# Patient Record
Sex: MALE | Race: WHITE | NOT HISPANIC OR LATINO | URBAN - METROPOLITAN AREA
[De-identification: names, ages, dates, MRNs, and addresses within clinical notes are randomized per-mention and may not be internally consistent; named-entity substitution may affect disease eponyms.]

---

## 2017-04-12 ENCOUNTER — EMERGENCY (EMERGENCY)
Facility: HOSPITAL | Age: 27
LOS: 1 days | Discharge: HOME | End: 2017-04-12
Attending: EMERGENCY MEDICINE | Admitting: EMERGENCY MEDICINE
Payer: COMMERCIAL

## 2017-04-12 VITALS
RESPIRATION RATE: 16 BRPM | DIASTOLIC BLOOD PRESSURE: 76 MMHG | WEIGHT: 182.1 LBS | HEART RATE: 61 BPM | OXYGEN SATURATION: 99 % | SYSTOLIC BLOOD PRESSURE: 126 MMHG | TEMPERATURE: 98 F

## 2017-04-12 DIAGNOSIS — R51 HEADACHE: ICD-10-CM

## 2017-04-12 LAB
ALBUMIN SERPL ELPH-MCNC: 4.1 G/DL — SIGNIFICANT CHANGE UP (ref 3.3–5)
ALP SERPL-CCNC: 77 U/L — SIGNIFICANT CHANGE UP (ref 40–120)
ALT FLD-CCNC: 26 U/L — SIGNIFICANT CHANGE UP (ref 12–78)
ANION GAP SERPL CALC-SCNC: 6 MMOL/L — SIGNIFICANT CHANGE UP (ref 5–17)
APTT BLD: 33.9 SEC — SIGNIFICANT CHANGE UP (ref 27.5–37.4)
AST SERPL-CCNC: 27 U/L — SIGNIFICANT CHANGE UP (ref 15–37)
BASOPHILS # BLD AUTO: 0.1 K/UL — SIGNIFICANT CHANGE UP (ref 0–0.2)
BASOPHILS NFR BLD AUTO: 1 % — SIGNIFICANT CHANGE UP (ref 0–2)
BILIRUB SERPL-MCNC: 0.3 MG/DL — SIGNIFICANT CHANGE UP (ref 0.2–1.2)
BUN SERPL-MCNC: 20 MG/DL — SIGNIFICANT CHANGE UP (ref 7–23)
CALCIUM SERPL-MCNC: 8.8 MG/DL — SIGNIFICANT CHANGE UP (ref 8.5–10.1)
CHLORIDE SERPL-SCNC: 107 MMOL/L — SIGNIFICANT CHANGE UP (ref 96–108)
CO2 SERPL-SCNC: 29 MMOL/L — SIGNIFICANT CHANGE UP (ref 22–31)
CREAT SERPL-MCNC: 0.96 MG/DL — SIGNIFICANT CHANGE UP (ref 0.5–1.3)
EOSINOPHIL # BLD AUTO: 0.2 K/UL — SIGNIFICANT CHANGE UP (ref 0–0.5)
EOSINOPHIL NFR BLD AUTO: 3.7 % — SIGNIFICANT CHANGE UP (ref 0–6)
GLUCOSE SERPL-MCNC: 81 MG/DL — SIGNIFICANT CHANGE UP (ref 70–99)
HCT VFR BLD CALC: 43.7 % — SIGNIFICANT CHANGE UP (ref 39–50)
HGB BLD-MCNC: 15.1 G/DL — SIGNIFICANT CHANGE UP (ref 13–17)
INR BLD: 1 RATIO — SIGNIFICANT CHANGE UP (ref 0.88–1.16)
LYMPHOCYTES # BLD AUTO: 1.8 K/UL — SIGNIFICANT CHANGE UP (ref 1–3.3)
LYMPHOCYTES # BLD AUTO: 31.5 % — SIGNIFICANT CHANGE UP (ref 13–44)
MCHC RBC-ENTMCNC: 30.9 PG — SIGNIFICANT CHANGE UP (ref 27–34)
MCHC RBC-ENTMCNC: 34.5 GM/DL — SIGNIFICANT CHANGE UP (ref 32–36)
MCV RBC AUTO: 89.4 FL — SIGNIFICANT CHANGE UP (ref 80–100)
MONOCYTES # BLD AUTO: 0.5 K/UL — SIGNIFICANT CHANGE UP (ref 0–0.9)
MONOCYTES NFR BLD AUTO: 8.6 % — SIGNIFICANT CHANGE UP (ref 1–9)
NEUTROPHILS # BLD AUTO: 3.2 K/UL — SIGNIFICANT CHANGE UP (ref 1.8–7.4)
NEUTROPHILS NFR BLD AUTO: 55.2 % — SIGNIFICANT CHANGE UP (ref 43–77)
PLATELET # BLD AUTO: 238 K/UL — SIGNIFICANT CHANGE UP (ref 150–400)
POTASSIUM SERPL-MCNC: 3.9 MMOL/L — SIGNIFICANT CHANGE UP (ref 3.5–5.3)
POTASSIUM SERPL-SCNC: 3.9 MMOL/L — SIGNIFICANT CHANGE UP (ref 3.5–5.3)
PROT SERPL-MCNC: 7.4 G/DL — SIGNIFICANT CHANGE UP (ref 6–8.3)
PROTHROM AB SERPL-ACNC: 10.9 SEC — SIGNIFICANT CHANGE UP (ref 9.8–12.7)
RBC # BLD: 4.89 M/UL — SIGNIFICANT CHANGE UP (ref 4.2–5.8)
RBC # FLD: 12 % — SIGNIFICANT CHANGE UP (ref 10.3–14.5)
SODIUM SERPL-SCNC: 142 MMOL/L — SIGNIFICANT CHANGE UP (ref 135–145)
WBC # BLD: 5.7 K/UL — SIGNIFICANT CHANGE UP (ref 3.8–10.5)
WBC # FLD AUTO: 5.7 K/UL — SIGNIFICANT CHANGE UP (ref 3.8–10.5)

## 2017-04-12 PROCEDURE — 70450 CT HEAD/BRAIN W/O DYE: CPT | Mod: 26

## 2017-04-12 PROCEDURE — 85027 COMPLETE CBC AUTOMATED: CPT

## 2017-04-12 PROCEDURE — 93005 ELECTROCARDIOGRAM TRACING: CPT

## 2017-04-12 PROCEDURE — 80053 COMPREHEN METABOLIC PANEL: CPT

## 2017-04-12 PROCEDURE — 85730 THROMBOPLASTIN TIME PARTIAL: CPT

## 2017-04-12 PROCEDURE — 99284 EMERGENCY DEPT VISIT MOD MDM: CPT

## 2017-04-12 PROCEDURE — 85610 PROTHROMBIN TIME: CPT

## 2017-04-12 PROCEDURE — 99284 EMERGENCY DEPT VISIT MOD MDM: CPT | Mod: 25

## 2017-04-12 PROCEDURE — 70450 CT HEAD/BRAIN W/O DYE: CPT

## 2017-04-12 PROCEDURE — 70496 CT ANGIOGRAPHY HEAD: CPT

## 2017-04-12 NOTE — ED ADULT NURSE NOTE - OBJECTIVE STATEMENT
pt c/o headache wheil working out at gym yesterday and today with associated nausea but no vomiting. symptoms resolved but came for eval. denies all other c/o.

## 2017-04-12 NOTE — ED PROVIDER NOTE - ATTENDING CONTRIBUTION TO CARE
28 yo M p/w frontal headache past 2 days, noticed when working out at gym. no fall / trauma. No fever/chills. no numb/ting/focal weak. no neck pain / stiffness. No cp/sob/palp. NO other inj or co.  Exam with nl non-focal detailed neuro exam. Neck supple. nl rest of exam.   Check labs, CTA (head) , outpt neuro fu

## 2017-04-12 NOTE — ED ADULT NURSE NOTE - CHPI ED SYMPTOMS NEG
no blurred vision/no confusion/no dizziness/no fever/no loss of consciousness/no numbness/no vomiting/no weakness/no change in level of consciousness

## 2017-04-12 NOTE — ED PROVIDER NOTE - PROGRESS NOTE DETAILS
Pt doing well, no acute co. Pt has been asymptomatic since arrival in ed. Michael pt and family re need for close, prompt fu with Dr Andrés Cole and with Neuro. They will see Dr Sanchez in office asap. Michael Rodrigez, pt can see her in office tomorrow. Michael pt re need for fu in her office in am.  Michael pt re risk of occult path and to not do sig exertion / workouts until cleared by neurology.  All results were explained to patient and family and a copy of all available results given.

## 2017-04-12 NOTE — ED PROVIDER NOTE - OBJECTIVE STATEMENT
26 yo male presents with episode of pain behind right eye yesterday and today while working out, was doing push ups yesterday had pain behind right eye and today was lifting weights when same episode occurred.  today felt dizzy during episode. now resolved.    PMD Dr Corley

## 2020-02-20 ENCOUNTER — OUTPATIENT (OUTPATIENT)
Dept: OUTPATIENT SERVICES | Facility: HOSPITAL | Age: 30
LOS: 1 days | End: 2020-02-20
Payer: MEDICAID

## 2020-02-20 DIAGNOSIS — R10.11 RIGHT UPPER QUADRANT PAIN: ICD-10-CM

## 2020-02-20 PROCEDURE — 74220 X-RAY XM ESOPHAGUS 1CNTRST: CPT | Mod: 26

## 2020-02-20 PROCEDURE — 74220 X-RAY XM ESOPHAGUS 1CNTRST: CPT

## 2020-12-23 ENCOUNTER — TRANSCRIPTION ENCOUNTER (OUTPATIENT)
Age: 30
End: 2020-12-23

## 2021-08-30 PROBLEM — Z00.00 ENCOUNTER FOR PREVENTIVE HEALTH EXAMINATION: Noted: 2021-08-30

## 2021-09-08 ENCOUNTER — APPOINTMENT (OUTPATIENT)
Dept: ORTHOPEDIC SURGERY | Facility: CLINIC | Age: 31
End: 2021-09-08
Payer: MEDICAID

## 2021-09-08 PROCEDURE — 99203 OFFICE O/P NEW LOW 30 MIN: CPT

## 2021-09-08 PROCEDURE — 72080 X-RAY EXAM THORACOLMB 2/> VW: CPT

## 2021-09-12 ENCOUNTER — TRANSCRIPTION ENCOUNTER (OUTPATIENT)
Age: 31
End: 2021-09-12

## 2021-09-20 ENCOUNTER — APPOINTMENT (OUTPATIENT)
Dept: RHEUMATOLOGY | Facility: CLINIC | Age: 31
End: 2021-09-20

## 2022-03-03 ENCOUNTER — APPOINTMENT (OUTPATIENT)
Dept: ORTHOPEDIC SURGERY | Facility: CLINIC | Age: 32
End: 2022-03-03

## 2022-03-10 ENCOUNTER — APPOINTMENT (OUTPATIENT)
Dept: ORTHOPEDIC SURGERY | Facility: CLINIC | Age: 32
End: 2022-03-10
Payer: MEDICAID

## 2022-03-10 PROCEDURE — 99214 OFFICE O/P EST MOD 30 MIN: CPT

## 2022-03-10 NOTE — HISTORY OF PRESENT ILLNESS
[de-identified] : 32-year-old male here today for follow-up of his right-sided low back pain that radiates to his right flank.  Patient states that he has had pain since his last visit that has not resolved despite NSAID use as well as physical therapy.  He has been to a GI doctor who has done extensive work-up for his gallbladder as well as any intra-abdominal pathology and is found no reason why he is having this pain.  States that after he coughs he has severe pain in his back that radiates to the front of his right side.  States that he takes Advil as needed but it does not help much with the pain.  Denies any numbness tingling or neurovascular compromise in lower extremities.  Denies any bowel or bladder issues.

## 2022-03-10 NOTE — DISCUSSION/SUMMARY
[Medication Risks Reviewed] : Medication risks reviewed [de-identified] : Patient is a 32-year-old male with low back pain that is failed to resolve with conservative treatment including directed physical therapy as well as NSAID use.  He has had extensive GI work-up that has not shown any abnormalities.  I therefore recommended an MRI of his lumbar spine for further evaluation to see if there is any underlying cause of this low back pain.  I have also recommended diclofenac 50 mg twice daily as needed for the pain.  I will see him back after the MRI for repeat evaluation and management.  All questions were asked and answered.  He was advised return to the office if any new onset numbness weakness tingling bowel or bladder issues or gait disturbances.

## 2022-03-10 NOTE — PHYSICAL EXAM
[de-identified] : GENERAL APPEARANCE: Well nourished and hydrated, pleasant, alert, and oriented x 3. Appears their stated age. \par HEENT: Normocephalic, extraocular eye motion intact. Nasal septum midline. Oral cavity clear. External auditory canal clear. \par RESPIRATORY: Breath sounds clear and audible in all lobes. No wheezing, No accessory muscle use.\par CARDIOVASCULAR: No apparent abnormalities. No lower leg edema. No varicosities. Pedal pulses are palpable.\par NEUROLOGIC: Sensation is normal, no muscle weakness in the upper or lower extremities.\par DERMATOLOGIC: No apparent skin lesions, moist, warm, no rash.\par MUSCULOSKELETAL: Hands, wrists, and elbows are normal and move freely, shoulders are normal and move freely. \par Psychiatric: Oriented to person, place, and time, insight and judgement were intact and the affect was normal. \par SPINE: Tenderness palpation over the right paraspinal musculature, nontender palpation over the lumbar spinous processes, lumbar spine full range of motion with pains at extreme extension, negative straight leg raise bilaterally, hips full range of motion, knees full range of motion, quads hamstrings FHL EHL tib ant posterior tib 5 out of 5 strength, reflexes 2+, sensation intact to light touch bilateral\par

## 2022-03-22 ENCOUNTER — APPOINTMENT (OUTPATIENT)
Dept: ORTHOPEDIC SURGERY | Facility: CLINIC | Age: 32
End: 2022-03-22
Payer: MEDICAID

## 2022-03-22 PROCEDURE — 99441: CPT

## 2022-04-07 ENCOUNTER — TRANSCRIPTION ENCOUNTER (OUTPATIENT)
Age: 32
End: 2022-04-07

## 2022-05-06 ENCOUNTER — EMERGENCY (EMERGENCY)
Facility: HOSPITAL | Age: 32
LOS: 1 days | Discharge: HOME | End: 2022-05-06
Attending: EMERGENCY MEDICINE
Payer: COMMERCIAL

## 2022-05-06 VITALS
RESPIRATION RATE: 20 BRPM | DIASTOLIC BLOOD PRESSURE: 85 MMHG | HEART RATE: 130 BPM | TEMPERATURE: 98 F | HEIGHT: 68 IN | OXYGEN SATURATION: 98 % | WEIGHT: 220.02 LBS | SYSTOLIC BLOOD PRESSURE: 170 MMHG

## 2022-05-06 VITALS — HEART RATE: 90 BPM

## 2022-05-06 LAB
ALBUMIN SERPL ELPH-MCNC: 4.6 G/DL — SIGNIFICANT CHANGE UP (ref 3.3–5.2)
ALP SERPL-CCNC: 80 U/L — SIGNIFICANT CHANGE UP (ref 40–120)
ALT FLD-CCNC: 42 U/L — HIGH
ANION GAP SERPL CALC-SCNC: 14 MMOL/L — SIGNIFICANT CHANGE UP (ref 5–17)
AST SERPL-CCNC: 25 U/L — SIGNIFICANT CHANGE UP
BASOPHILS # BLD AUTO: 0.03 K/UL — SIGNIFICANT CHANGE UP (ref 0–0.2)
BASOPHILS NFR BLD AUTO: 0.3 % — SIGNIFICANT CHANGE UP (ref 0–2)
BILIRUB SERPL-MCNC: 0.3 MG/DL — LOW (ref 0.4–2)
BUN SERPL-MCNC: 12.6 MG/DL — SIGNIFICANT CHANGE UP (ref 8–20)
CALCIUM SERPL-MCNC: 9.1 MG/DL — SIGNIFICANT CHANGE UP (ref 8.6–10.2)
CHLORIDE SERPL-SCNC: 104 MMOL/L — SIGNIFICANT CHANGE UP (ref 98–107)
CO2 SERPL-SCNC: 23 MMOL/L — SIGNIFICANT CHANGE UP (ref 22–29)
CREAT SERPL-MCNC: 0.82 MG/DL — SIGNIFICANT CHANGE UP (ref 0.5–1.3)
EGFR: 121 ML/MIN/1.73M2 — SIGNIFICANT CHANGE UP
EOSINOPHIL # BLD AUTO: 0.04 K/UL — SIGNIFICANT CHANGE UP (ref 0–0.5)
EOSINOPHIL NFR BLD AUTO: 0.5 % — SIGNIFICANT CHANGE UP (ref 0–6)
GLUCOSE SERPL-MCNC: 92 MG/DL — SIGNIFICANT CHANGE UP (ref 70–99)
HCT VFR BLD CALC: 45.3 % — SIGNIFICANT CHANGE UP (ref 39–50)
HGB BLD-MCNC: 15.3 G/DL — SIGNIFICANT CHANGE UP (ref 13–17)
IMM GRANULOCYTES NFR BLD AUTO: 0.3 % — SIGNIFICANT CHANGE UP (ref 0–1.5)
LIDOCAIN IGE QN: 31 U/L — SIGNIFICANT CHANGE UP (ref 22–51)
LYMPHOCYTES # BLD AUTO: 1.53 K/UL — SIGNIFICANT CHANGE UP (ref 1–3.3)
LYMPHOCYTES # BLD AUTO: 17.4 % — SIGNIFICANT CHANGE UP (ref 13–44)
MCHC RBC-ENTMCNC: 29.3 PG — SIGNIFICANT CHANGE UP (ref 27–34)
MCHC RBC-ENTMCNC: 33.8 GM/DL — SIGNIFICANT CHANGE UP (ref 32–36)
MCV RBC AUTO: 86.6 FL — SIGNIFICANT CHANGE UP (ref 80–100)
MONOCYTES # BLD AUTO: 0.54 K/UL — SIGNIFICANT CHANGE UP (ref 0–0.9)
MONOCYTES NFR BLD AUTO: 6.2 % — SIGNIFICANT CHANGE UP (ref 2–14)
NEUTROPHILS # BLD AUTO: 6.6 K/UL — SIGNIFICANT CHANGE UP (ref 1.8–7.4)
NEUTROPHILS NFR BLD AUTO: 75.3 % — SIGNIFICANT CHANGE UP (ref 43–77)
PLATELET # BLD AUTO: 274 K/UL — SIGNIFICANT CHANGE UP (ref 150–400)
POTASSIUM SERPL-MCNC: 3.6 MMOL/L — SIGNIFICANT CHANGE UP (ref 3.5–5.3)
POTASSIUM SERPL-SCNC: 3.6 MMOL/L — SIGNIFICANT CHANGE UP (ref 3.5–5.3)
PROT SERPL-MCNC: 7.8 G/DL — SIGNIFICANT CHANGE UP (ref 6.6–8.7)
RBC # BLD: 5.23 M/UL — SIGNIFICANT CHANGE UP (ref 4.2–5.8)
RBC # FLD: 12.9 % — SIGNIFICANT CHANGE UP (ref 10.3–14.5)
SODIUM SERPL-SCNC: 141 MMOL/L — SIGNIFICANT CHANGE UP (ref 135–145)
WBC # BLD: 8.77 K/UL — SIGNIFICANT CHANGE UP (ref 3.8–10.5)
WBC # FLD AUTO: 8.77 K/UL — SIGNIFICANT CHANGE UP (ref 3.8–10.5)

## 2022-05-06 PROCEDURE — 74176 CT ABD & PELVIS W/O CONTRAST: CPT | Mod: 26,MA

## 2022-05-06 PROCEDURE — 99284 EMERGENCY DEPT VISIT MOD MDM: CPT | Mod: 25

## 2022-05-06 PROCEDURE — 85025 COMPLETE CBC W/AUTO DIFF WBC: CPT

## 2022-05-06 PROCEDURE — 80053 COMPREHEN METABOLIC PANEL: CPT

## 2022-05-06 PROCEDURE — 99285 EMERGENCY DEPT VISIT HI MDM: CPT

## 2022-05-06 PROCEDURE — 71250 CT THORAX DX C-: CPT | Mod: 26,MA

## 2022-05-06 PROCEDURE — 74176 CT ABD & PELVIS W/O CONTRAST: CPT | Mod: MA

## 2022-05-06 PROCEDURE — 96374 THER/PROPH/DIAG INJ IV PUSH: CPT

## 2022-05-06 PROCEDURE — 36415 COLL VENOUS BLD VENIPUNCTURE: CPT

## 2022-05-06 PROCEDURE — 71250 CT THORAX DX C-: CPT | Mod: MA

## 2022-05-06 PROCEDURE — 83690 ASSAY OF LIPASE: CPT

## 2022-05-06 RX ORDER — MELOXICAM 15 MG/1
1 TABLET ORAL
Qty: 30 | Refills: 0
Start: 2022-05-06 | End: 2022-06-04

## 2022-05-06 RX ORDER — KETOROLAC TROMETHAMINE 30 MG/ML
15 SYRINGE (ML) INJECTION ONCE
Refills: 0 | Status: DISCONTINUED | OUTPATIENT
Start: 2022-05-06 | End: 2022-05-06

## 2022-05-06 RX ORDER — METHOCARBAMOL 500 MG/1
2 TABLET, FILM COATED ORAL
Qty: 60 | Refills: 0
Start: 2022-05-06 | End: 2022-05-15

## 2022-05-06 RX ORDER — METHOCARBAMOL 500 MG/1
1500 TABLET, FILM COATED ORAL ONCE
Refills: 0 | Status: COMPLETED | OUTPATIENT
Start: 2022-05-06 | End: 2022-05-06

## 2022-05-06 RX ADMIN — Medication 15 MILLIGRAM(S): at 16:03

## 2022-05-06 RX ADMIN — METHOCARBAMOL 1500 MILLIGRAM(S): 500 TABLET, FILM COATED ORAL at 16:56

## 2022-05-06 NOTE — ED PROVIDER NOTE - RESPIRATORY, MLM
Breath sounds clear and equal bilaterally. ttp along approximately the 9th rib from the mid axillary line to the mid clavicular line

## 2022-05-06 NOTE — ED PROVIDER NOTE - OBJECTIVE STATEMENT
32 year old male with no PMH presents with R flank pain. Pt reports that he has been having intermittent R flank pain for approximately 5-6 months. Pt denies any obvious alleviating/exacerbating or inciting factors. Not related to exertion, deep inspiration, food intake. No associated fevers, chills, cough, vomiting, diarrhea, dysuria, hematuria. The pt has had extensive work up for this, including negative RUQ US x 2, HIDA scan, lumbar MRI, and abd MRI.

## 2022-05-06 NOTE — ED PROVIDER NOTE - PATIENT PORTAL LINK FT
You can access the FollowMyHealth Patient Portal offered by [facility] by registering at the following website: [email or web address]. By joining [employer]’s FollowMyHealth portal, you will also be able to view your health information using other applications (apps) compatible with our system.

## 2022-05-06 NOTE — ED PROVIDER NOTE - NSFOLLOWUPINSTRUCTIONS_ED_ALL_ED_FT
Please follow up with your doctor within 48 hours.     SEEK IMMEDIATE MEDICAL CARE IF YOU HAVE ANY OF THE FOLLOWING SYMPTOMS: worsening chest pain, coughing up blood, unexplained back/neck/jaw pain, severe abdominal pain, dizziness or lightheadedness, fainting, shortness of breath, sweaty or clammy skin, vomiting, or racing heart beat. These symptoms may represent a serious problem that is an emergency. Do not wait to see if the symptoms will go away. Get medical help right away. Call 911 and do not drive yourself to the hospital.     SEEK IMMEDIATE MEDICAL CARE IF YOU HAVE ANY OF THE FOLLOWING SYMPTOMS: worsening abdominal pain, uncontrollable vomiting, profuse diarrhea, inability to have bowel movements or pass gas, black or bloody stools, fever accompanying chest pain or back pain, or fainting. These symptoms may represent a serious problem that is an emergency. Do not wait to see if the symptoms will go away. Get medical help right away. Call 911 and do not drive yourself to the hospital.

## 2022-05-06 NOTE — ED PROVIDER NOTE - ATTENDING APP SHARED VISIT CONTRIBUTION OF CARE
I, Trav Sunshine, performed a face to face bedside interview with this patient regarding history of present illness, review of symptoms and relevant past medical, social and family history.  I completed an independent physical examination. I have communicated the patient’s plan of care and disposition with the ACP.  32 year old male with no PMH presents with R flank pain intermittently for the last 5-6 months. No cough, hemoptysis, LE edema, diarrhea, urinary complaints. Not related to exertion, deep inspiration or exertion. Pt with negative work up of abd mri, lumbar mri, US x 2, hida  Gen: NAD, well appearing  CV: RRR  Pul: CTA b/l, ttp along approximately the 9th rib from the mid axillary line to the mid clavicular line  Abd: Soft, non-distended, non-tender  Neuro: no focal deficits  Pt improved, reproducible along ribs, imaging neg, suspect muscular,  stable for dc

## 2022-05-06 NOTE — ED PROVIDER NOTE - NS ED ATTENDING STATEMENT MOD
This was a shared visit with the S[facility]. I reviewed and verified the documentation and independently performed the documented:

## 2022-05-06 NOTE — ED PROVIDER NOTE - PROGRESS NOTE DETAILS
Incidental findings discussed including lung nodule and need for follow up. All results printed and provided to PT to bring to his PMD. ED return precautions discussed.

## 2022-05-19 ENCOUNTER — APPOINTMENT (OUTPATIENT)
Dept: PHYSICAL MEDICINE AND REHAB | Facility: CLINIC | Age: 32
End: 2022-05-19

## 2022-05-24 ENCOUNTER — APPOINTMENT (OUTPATIENT)
Dept: UROLOGY | Facility: CLINIC | Age: 32
End: 2022-05-24
Payer: MEDICAID

## 2022-05-24 VITALS — WEIGHT: 220 LBS | HEIGHT: 68 IN | BODY MASS INDEX: 33.34 KG/M2

## 2022-05-24 PROCEDURE — 99204 OFFICE O/P NEW MOD 45 MIN: CPT

## 2022-05-24 NOTE — HISTORY OF PRESENT ILLNESS
[FreeTextEntry1] : 31 yo M for initial consultation\par no PMH\par no PSH\par NKDA\par not a smoker\par graduating radiology, getting  in the summer\par \par no previous history of nephrolithiasis\par had severe flank pain\par CT: no hydro, multiple punctate stones bilaterally\par also demonstrated a known hiatal hernia and a small 3 mm finding in the lung\par \par discussed the findings with the patient\par discussed stone prevention and metabolic evaluation\par discussed general recommendations for now\par - increasing fluid intake to produce 2 to 2.5 liters of urine per day (approx 3 liters intake), and should be primarily water.\par - Citrate is a benefit; yi and limes with most citrate and least sugar. Recommend a lemon or lime a day, easiest with concentrate, mixed into water or other beverages.\par Lifestyle changes: regular exercise and weight loss both are independent risk-reducers for stones.\par \par \par plan:\par - next visit in 2 months with new 24 hr urine\par - next visit will be with US\par

## 2022-05-24 NOTE — ASSESSMENT
[FreeTextEntry1] : \par plan:\par - next visit in 2 months with new 24 hr urine\par - next visit will be with US

## 2022-06-22 ENCOUNTER — APPOINTMENT (OUTPATIENT)
Dept: UROLOGY | Facility: CLINIC | Age: 32
End: 2022-06-22
Payer: MEDICAID

## 2022-06-22 PROCEDURE — 99214 OFFICE O/P EST MOD 30 MIN: CPT

## 2022-06-22 NOTE — ASSESSMENT
[FreeTextEntry1] : \par \par plan:\par - wait for ultrasound report and update results over the phone\par - continue with fluids and pain medication as needed\par - schedule TTM in 2 weeks to discuss symptoms\par - sent urinalysis and culture\par - if pain persists will need CT\par - if symptoms improve will assume he passed a stone and schedule next follow up in 6 months with new sono and 24 hr

## 2022-06-22 NOTE — HISTORY OF PRESENT ILLNESS
[FreeTextEntry1] : 33 yo M for follow up with nephrolithiasis\par see full notes from previous visit \par \par started with right flank pain radiating to the right upper quadrant\par last CT 05/2022 with small punctate stones bilaterally, largest no more than 2 mm\par had ultrasound today (brit) still no report, I do not see any hydronephrosis\par 24 hr urine: volumes 2.7-4.5, normal Ca and Ox, one with very low citrate, one with high UUN\par \par discussed findings of the 24 hr urine, does not need to drink as much, recommended 2-2.5 liters of urine daily. discussed the other findings and will not modify his diet drastically due to that for now, only recommended adding lemon and lime\par \par regarding the pain, exam now is unremarkable, discussed the possibility of him passing a stone, and will try MET\par will wait for results of ultrasound, and if the pain does not improve will nee a new CT to confirm. \par \par \par plan:\par - wait for ultrasound report and update results over the phone\par - continue with fluids and pain medication as needed\par - schedule TTM in 2 weeks to discuss symptoms\par - sent urinalysis and culture\par - if pain persists will need CT\par - if symptoms improve will assume he passed a stone and schedule next follow up in 6 months with new sono and 24 hr

## 2022-06-22 NOTE — PHYSICAL EXAM
[General Appearance - Well Developed] : well developed [General Appearance - Well Nourished] : well nourished [Normal Appearance] : normal appearance [Well Groomed] : well groomed [General Appearance - In No Acute Distress] : no acute distress [Abdomen Soft] : soft [Abdomen Tenderness] : non-tender [Abdomen Mass (___ Cm)] : no abdominal mass palpated [Costovertebral Angle Tenderness] : no ~M costovertebral angle tenderness [Edema] : no peripheral edema [] : no respiratory distress [Respiration, Rhythm And Depth] : normal respiratory rhythm and effort [Exaggerated Use Of Accessory Muscles For Inspiration] : no accessory muscle use [Oriented To Time, Place, And Person] : oriented to person, place, and time [Affect] : the affect was normal [Mood] : the mood was normal [Not Anxious] : not anxious [Normal Station and Gait] : the gait and station were normal for the patient's age

## 2022-06-23 LAB
APPEARANCE: CLEAR
BACTERIA: NEGATIVE
BILIRUBIN URINE: NEGATIVE
BLOOD URINE: NEGATIVE
COLOR: NORMAL
GLUCOSE QUALITATIVE U: NEGATIVE
HYALINE CASTS: 0 /LPF
KETONES URINE: NEGATIVE
LEUKOCYTE ESTERASE URINE: NEGATIVE
MICROSCOPIC-UA: NORMAL
NITRITE URINE: NEGATIVE
PH URINE: 7.5
PROTEIN URINE: NEGATIVE
RED BLOOD CELLS URINE: 0 /HPF
SPECIFIC GRAVITY URINE: 1.01
SQUAMOUS EPITHELIAL CELLS: 0 /HPF
UROBILINOGEN URINE: NORMAL
WHITE BLOOD CELLS URINE: 0 /HPF

## 2022-06-27 LAB — BACTERIA UR CULT: NORMAL

## 2022-07-06 ENCOUNTER — APPOINTMENT (OUTPATIENT)
Dept: UROLOGY | Facility: CLINIC | Age: 32
End: 2022-07-06

## 2022-07-08 ENCOUNTER — APPOINTMENT (OUTPATIENT)
Dept: UROLOGY | Facility: CLINIC | Age: 32
End: 2022-07-08

## 2022-07-08 PROCEDURE — 99442: CPT

## 2022-07-11 NOTE — HISTORY OF PRESENT ILLNESS
[Home] : at home, [unfilled] , at the time of the visit. [Medical Office: ([facility])___] : at the medical office located in  [FreeTextEntry1] : 31 yo M for follow up with nephrolithiasis\par see full notes from previous visit \par \par started with right flank pain radiating to the right upper quadrant\par last CT 05/2022 with small punctate stones bilaterally, largest no more than 2 mm\par had ultrasound with last visit (brit): no hydro, possible non-obstructing left renal stone\par 24 hr urine: volumes 2.7-4.5, normal Ca and Ox, one with very low citrate, one with high UUN\par \par patient is still feeling some discomfort and occasional pain\par no fever\par discussed this with the patient, will need a new CT to confirm no stones in the ureter\par \par plan:\par - CT\par - visit next week to discuss results

## 2022-07-12 ENCOUNTER — APPOINTMENT (OUTPATIENT)
Dept: CT IMAGING | Facility: CLINIC | Age: 32
End: 2022-07-12

## 2022-07-12 ENCOUNTER — OUTPATIENT (OUTPATIENT)
Dept: OUTPATIENT SERVICES | Facility: HOSPITAL | Age: 32
LOS: 1 days | End: 2022-07-12
Payer: MEDICAID

## 2022-07-12 DIAGNOSIS — N20.0 CALCULUS OF KIDNEY: ICD-10-CM

## 2022-07-12 PROCEDURE — 74176 CT ABD & PELVIS W/O CONTRAST: CPT | Mod: 26

## 2022-07-12 PROCEDURE — 74176 CT ABD & PELVIS W/O CONTRAST: CPT

## 2022-07-18 ENCOUNTER — APPOINTMENT (OUTPATIENT)
Dept: UROLOGY | Facility: CLINIC | Age: 32
End: 2022-07-18

## 2022-09-16 ENCOUNTER — APPOINTMENT (OUTPATIENT)
Dept: PHYSICAL MEDICINE AND REHAB | Facility: CLINIC | Age: 32
End: 2022-09-16

## 2022-09-16 VITALS
HEIGHT: 68 IN | SYSTOLIC BLOOD PRESSURE: 135 MMHG | HEART RATE: 89 BPM | WEIGHT: 220 LBS | DIASTOLIC BLOOD PRESSURE: 95 MMHG | BODY MASS INDEX: 33.34 KG/M2

## 2022-09-16 PROCEDURE — 99204 OFFICE O/P NEW MOD 45 MIN: CPT

## 2022-09-16 NOTE — PHYSICAL EXAM
[FreeTextEntry1] : NAD\par A&Ox3\par Mild obesity\par ROM L-spine: near full forward flexion; 25-30' extension w/o pain\par ROM Hips: smooth IRER w/o pain\par Pelvic tilt: none\par Seated slump test: neg right\par SLR: neg right\par ALESSIO's: neg right\par FADIR's: neg right\par DTR's: 2+ knees/ankles\par MMT: 5/5 b/l LE\par Sensation: SILT\par Toe & Heel Walk: Yes\par Palpation: R QL muscle TTP\par

## 2022-09-16 NOTE — DATA REVIEWED
[CT Scan] : CT Scan [MRI] : MRI [FreeTextEntry1] : CT ABD/Pelvis (May 2022):  Punctate bilateral nonobstructing renal calculi. No hydronephrosis or ureteral calculus.\par \par MRI L-spine: essentially normal.
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
## 2022-09-16 NOTE — ASSESSMENT
[FreeTextEntry1] : 32 y.o. M w/ h/o chronic right lateral lumbar/flank pain of unclear etiology.  I spent most of today's office visit (40 min) reviewing the patient's relevant imaging studies, discussing possible etiology, pathogenesis, further diagnostic work-up and non-operative management.  MRI L-spine w/o significant HNP, central canal or NF stenosis on patient's symptomatic right side.  There is note made of a non-acute appearing Schmorl's node at T10-11.  Pain is likely not visceral in nature.  Discussed utility of US guided right QL LA injections to better diagnosis his primary pain generator but would like patient to try more conservative measures first (ie, OMT, ART, etc.).  Will refer to Dr. Ram.  No role for LESI or LAKEISHAI.  Pt. and wife are in agreement with plan.  All questions answered.  RTC prn.

## 2022-09-29 ENCOUNTER — APPOINTMENT (OUTPATIENT)
Dept: PHYSICAL MEDICINE AND REHAB | Facility: CLINIC | Age: 32
End: 2022-09-29

## 2022-09-29 ENCOUNTER — TRANSCRIPTION ENCOUNTER (OUTPATIENT)
Age: 32
End: 2022-09-29

## 2022-09-29 ENCOUNTER — OUTPATIENT (OUTPATIENT)
Dept: OUTPATIENT SERVICES | Facility: HOSPITAL | Age: 32
LOS: 1 days | End: 2022-09-29

## 2022-09-29 DIAGNOSIS — M54.6 PAIN IN THORACIC SPINE: ICD-10-CM

## 2022-09-29 PROCEDURE — 99214 OFFICE O/P EST MOD 30 MIN: CPT

## 2022-09-29 PROCEDURE — 72070 X-RAY EXAM THORAC SPINE 2VWS: CPT | Mod: 26

## 2022-09-29 NOTE — DATA REVIEWED
[FreeTextEntry1] : CT renal stone hunt July 2022: Bones no aggressive lesion.\par \par MRI lumbar spine March 2022: Nonspecific straining.  No significant spinal canal or neuroforaminal stenosis.  Mild lower lumbar facet arthrosis.

## 2022-09-29 NOTE — HISTORY OF PRESENT ILLNESS
[FreeTextEntry1] : Mr. Young is a 32-year-old male with no significant past medical history presenting for evaluation.  He reports for over a year he has had significant pain in his right upper flank region.  Describes the pain as being tender to palpation with sometimes radiation.  Denies any overt weakness numbness tingling bowel bladder dysfunction.  He has had extensive work-up including in the ED, with urology with [facility] medicine.  In the past he has had CT and evaluation with GI as well as MRIs of the lumbar spine.  He reports that no overt cause can be identified.

## 2022-09-29 NOTE — PHYSICAL EXAM
[FreeTextEntry1] : Gen: Patient is A&O x 3, NAD\par HEENT: EOMI, hearing grossly normal\par Resp: regular, non - labored\par CV: pulses regular\par Skin: no rashes, erythema\par Lymph: no clubbing, cyanosis, edema, or palpable lymphadenopathy\par Inspection: no instability or misalignment\par ROM: full throughout\par Palpation: TTP right T7-T9 thoracic paraspinals \par Sensation: intact to light touch\par Reflexes: 1+ and symmetric throughout\par Strength: 5/5 throughout\par Special tests: -Osullivan's sign, -Straight leg raise \par Gait: normal, non-antalgic\par \par

## 2022-09-29 NOTE — ASSESSMENT
[FreeTextEntry1] : 32 year old male presenting for evaluation.\par \par #Flank pain/thoracic pain:\par -He reports he has had chronic pain in his right flank and thoracic region for over a year.  He reports he has had extensive work-up with no significant cause.\par -CT abdomen reviewed\par -MRI lumbar spine reviewed\par -Orthopedic notes reviewed, recommend continue conservative therapy\par -Discussed with patient he does appear on physical exam to have a component of some somatic dysfunction.  Discussed that this could be a cause of his pain if it is musculoskeletal in etiology.  Discussed with patient risks and benefits of OMT.\par -We will obtain x-ray of thoracic spine to further evaluate prior to manual therapy\par -Methocarbamol 750 mg as needed for severe muscle spasms\par -He is in agreement to a trial of OMT.\par \par Follow-up after x-ray for OMT.

## 2022-10-04 ENCOUNTER — APPOINTMENT (OUTPATIENT)
Dept: ORTHOPEDIC SURGERY | Facility: CLINIC | Age: 32
End: 2022-10-04

## 2022-10-06 ENCOUNTER — APPOINTMENT (OUTPATIENT)
Dept: PHYSICAL MEDICINE AND REHAB | Facility: CLINIC | Age: 32
End: 2022-10-06

## 2022-10-06 PROCEDURE — 98929 OSTEOPATH MANJ 9-10 REGIONS: CPT

## 2022-10-06 NOTE — DATA REVIEWED
[FreeTextEntry1] : EXAM: [id number] - XR T SPINE 2 VIEWS - ORDERED BY: BONY ESPINAL\par \par \par PROCEDURE DATE: 09/29/2022\par \par \par \par INTERPRETATION: CLINICAL INDICATION: back pain\par \par EXAM:\par AP lateral thoracic spine from 9/29/2022 at 1438. Compared appearance on chest CT from 5/6/2022.\par \par IMPRESSION:\par 12 rib pairs present.\par \par No compression fractures or spondylolistheses.\par \par T9-T11 Schmorl's node endplate changes apparent on CT images not well demonstrated radiographically. Otherwise preserved intervertebral disc spaces.\par \par Posterior facet alignment maintained.\par \par No discrete lytic or blastic lesions.\par \par Clear visualized lungs and midline normal caliber trachea.\par

## 2022-10-06 NOTE — PHYSICAL EXAM
[FreeTextEntry1] : Gen: Patient is A&O x 3, NAD\par HEENT: EOMI, hearing grossly normal\par Resp: regular, non - labored\par CV: pulses regular\par Skin: no rashes, erythema\par Lymph: no clubbing, cyanosis, edema, or palpable lymphadenopathy\par Inspection: no instability or misalignment\par ROM: full throughout\par Palpation: TTP right T7-T9 thoracic paraspinals, right periscapular muscles TTP  \par Sensation: intact to light touch\par Reflexes: 1+ and symmetric throughout\par Strength: 5/5 throughout\par Special tests: -Osullivan's sign, -Straight leg raise \par Gait: normal, non-antalgic\par \par Osteopathic Structural Exam:\par Cranial: OA rotated right\par Cervical: Right C4, C5 tenderpoint\par Thoracic: T9 ERRSBR\par Rib: Right posterior rib 7 tenderpoint \par Upper extremity: Right scapula myofascial restriction \par Lumbar: Right posterior L3 tenderpoint \par Pelvis: Right medial innominate \par Sacrum: Right sacral rotation\par Lower Extremity: RLE restricted in internal rotation \par \par \par \par \par

## 2022-10-06 NOTE — HISTORY OF PRESENT ILLNESS
[FreeTextEntry1] : Mr. Young is a 32-year-old male with no significant past medical history presenting for evaluation.  He reports for over a year he has had significant pain in his right upper flank region.  Describes the pain as being tender to palpation with sometimes radiation.  Denies any overt weakness numbness tingling bowel bladder dysfunction.  He has had extensive work-up including in the ED, with urology with [facility].  In the past he has had CT and evaluation with GI as well as MRIs of the lumbar spine.  He reports that no overt cause can be identified. \par \par Interval history:\par He reports he still is having pain and tightness at the entire right side of his body.  Worst pain is in his right flank with radiation across his abdomen.  Denies any new weakness numbness ting or bowel bladder dysfunction.  Reports he wants to trial OMT today.

## 2022-10-06 NOTE — ASSESSMENT
[FreeTextEntry1] : 32 year old male presenting for evaluation.\par \par #Flank pain/thoracic pain:\par -x-ray thoracic spine reviewed\par -Discussed risks and benefits of OMT\par -Osteopathic structural exam demonstrated somatic dysfunction and the patient agreed to osteopathic manipulation.\par  \par 1. Somatic dysfunction cranial\par -OMT performed with myofascial release\par 2. Somatic dysfunction cervical\par --OMT performed with counterstrain \par 3. Somatic dysfunction upper extremity\par --OMT performed with myofascial release\par 4. Somatic dysfunction rib\par --OMT performed with counterstrain\par 5. Somatic dysfunction thoracic \par --OMT performed with muscle energy \par 6. Somatic dysfunction lumbar \par --OMT performed with counterstrain\par 7. Somatic dysfunction pelvis\par --OMT performed with muscle energy \par 8. Somatic dysfunction sacrum\par --OMT performed with myofascial release \par 9. Somatic dysfunction lower extremity \par --OMT performed with myofascial release\par \par Patient tolerated treatment well.\par \par Follow up in 1-2 weeks.  \par

## 2022-10-13 ENCOUNTER — APPOINTMENT (OUTPATIENT)
Dept: PHYSICAL MEDICINE AND REHAB | Facility: CLINIC | Age: 32
End: 2022-10-13

## 2022-10-13 VITALS
DIASTOLIC BLOOD PRESSURE: 96 MMHG | HEIGHT: 68 IN | WEIGHT: 220 LBS | HEART RATE: 84 BPM | SYSTOLIC BLOOD PRESSURE: 142 MMHG | BODY MASS INDEX: 33.34 KG/M2

## 2022-10-13 PROCEDURE — 98929 OSTEOPATH MANJ 9-10 REGIONS: CPT

## 2022-10-13 NOTE — PHYSICAL EXAM
[FreeTextEntry1] : Gen: Patient is A&O x 3, NAD\par HEENT: EOMI, hearing grossly normal\par Resp: regular, non - labored\par CV: pulses regular\par Skin: no rashes, erythema\par Lymph: no clubbing, cyanosis, edema, or palpable lymphadenopathy\par Inspection: no instability or misalignment\par ROM: full throughout\par Palpation: TTP right T7-T9 thoracic paraspinals, right periscapular muscles TTP  \par Sensation: intact to light touch\par Reflexes: 1+ and symmetric throughout\par Strength: 5/5 throughout\par Special tests: -Osullivan's sign, -Straight leg raise \par Gait: normal, non-antalgic\par \par Osteopathic Structural Exam:\par Cranial: OA rotated right\par Cervical: Right C6, C7 tenderpoint\par Thoracic: T7 ERRSBR\par Rib: Right posterior rib 10 tenderpoint \par Upper extremity: Right scapula myofascial restriction \par Lumbar: Right posterior L4 tenderpoint \par Pelvis: Right medial innominate \par Sacrum: Right sacral rotation\par Lower Extremity: RLE restricted in internal rotation \par \par \par \par \par  48

## 2022-10-13 NOTE — HISTORY OF PRESENT ILLNESS
[FreeTextEntry1] : Mr. Young is a 32-year-old male with no significant past medical history presenting for evaluation.  He reports for over a year he has had significant pain in his right upper flank region.  Describes the pain as being tender to palpation with sometimes radiation.  Denies any overt weakness numbness tingling bowel bladder dysfunction.  He has had extensive work-up including in the ED, with urology with [facility].  In the past he has had CT and evaluation with GI as well as MRIs of the lumbar spine.  He reports that no overt cause can be identified. \par \par Interval history:\par He reports since last visit he did notice some improvement in his pain symptoms.  He reports that his back pain has improved.  He is now feeling some pain in his right flank and in the right anterior abdomen region.  Also reports some tightness in his right hip.  Reports his periscapular pain is improving.  Denies any new weakness numbness tingling or bowel bladder dysfunction.  Would like to proceed with OMT today.

## 2022-10-13 NOTE — ASSESSMENT
[FreeTextEntry1] : 32 year old male presenting for evaluation.\par \par #Flank pain/thoracic pain:\par -He is noting some improvement in his pain since last visit and would like to continue with OMT\par -Discussed risks and benefits of OMT\par -Osteopathic structural exam demonstrated somatic dysfunction and the patient agreed to osteopathic manipulation.\par  \par 1. Somatic dysfunction cranial\par -OMT performed with myofascial release\par 2. Somatic dysfunction cervical\par --OMT performed with counterstrain \par 3. Somatic dysfunction upper extremity\par --OMT performed with myofascial release\par 4. Somatic dysfunction rib\par --OMT performed with counterstrain\par 5. Somatic dysfunction thoracic \par --OMT performed with muscle energy \par 6. Somatic dysfunction lumbar \par --OMT performed with counterstrain\par 7. Somatic dysfunction pelvis\par --OMT performed with muscle energy \par 8. Somatic dysfunction sacrum\par --OMT performed with myofascial release \par 9. Somatic dysfunction lower extremity \par --OMT performed with myofascial release\par \par Patient tolerated treatment well.\par \par Follow up in 1-2 weeks.  \par

## 2022-10-18 ENCOUNTER — NON-APPOINTMENT (OUTPATIENT)
Age: 32
End: 2022-10-18

## 2022-10-18 ENCOUNTER — EMERGENCY (EMERGENCY)
Facility: HOSPITAL | Age: 32
LOS: 1 days | Discharge: HOME | End: 2022-10-18
Attending: EMERGENCY MEDICINE
Payer: COMMERCIAL

## 2022-10-18 VITALS
WEIGHT: 225.09 LBS | SYSTOLIC BLOOD PRESSURE: 154 MMHG | RESPIRATION RATE: 18 BRPM | HEIGHT: 68 IN | HEART RATE: 89 BPM | DIASTOLIC BLOOD PRESSURE: 89 MMHG | TEMPERATURE: 99 F | OXYGEN SATURATION: 97 %

## 2022-10-18 LAB
ALBUMIN SERPL ELPH-MCNC: 4.4 G/DL — SIGNIFICANT CHANGE UP (ref 3.3–5.2)
ALP SERPL-CCNC: 76 U/L — SIGNIFICANT CHANGE UP (ref 40–120)
ALT FLD-CCNC: 50 U/L — HIGH
ANION GAP SERPL CALC-SCNC: 15 MMOL/L — SIGNIFICANT CHANGE UP (ref 5–17)
AST SERPL-CCNC: 37 U/L — SIGNIFICANT CHANGE UP
BASOPHILS # BLD AUTO: 0.03 K/UL — SIGNIFICANT CHANGE UP (ref 0–0.2)
BASOPHILS NFR BLD AUTO: 0.4 % — SIGNIFICANT CHANGE UP (ref 0–2)
BILIRUB SERPL-MCNC: 0.3 MG/DL — LOW (ref 0.4–2)
BUN SERPL-MCNC: 17 MG/DL — SIGNIFICANT CHANGE UP (ref 8–20)
CALCIUM SERPL-MCNC: 9.3 MG/DL — SIGNIFICANT CHANGE UP (ref 8.4–10.5)
CHLORIDE SERPL-SCNC: 102 MMOL/L — SIGNIFICANT CHANGE UP (ref 98–107)
CO2 SERPL-SCNC: 22 MMOL/L — SIGNIFICANT CHANGE UP (ref 22–29)
CREAT SERPL-MCNC: 0.77 MG/DL — SIGNIFICANT CHANGE UP (ref 0.5–1.3)
D DIMER BLD IA.RAPID-MCNC: 686 NG/ML DDU — HIGH
EGFR: 124 ML/MIN/1.73M2 — SIGNIFICANT CHANGE UP
EOSINOPHIL # BLD AUTO: 0.06 K/UL — SIGNIFICANT CHANGE UP (ref 0–0.5)
EOSINOPHIL NFR BLD AUTO: 0.7 % — SIGNIFICANT CHANGE UP (ref 0–6)
GLUCOSE SERPL-MCNC: 91 MG/DL — SIGNIFICANT CHANGE UP (ref 70–99)
HCT VFR BLD CALC: 44.2 % — SIGNIFICANT CHANGE UP (ref 39–50)
HGB BLD-MCNC: 15.4 G/DL — SIGNIFICANT CHANGE UP (ref 13–17)
IMM GRANULOCYTES NFR BLD AUTO: 0.4 % — SIGNIFICANT CHANGE UP (ref 0–0.9)
LYMPHOCYTES # BLD AUTO: 1.15 K/UL — SIGNIFICANT CHANGE UP (ref 1–3.3)
LYMPHOCYTES # BLD AUTO: 14.1 % — SIGNIFICANT CHANGE UP (ref 13–44)
MCHC RBC-ENTMCNC: 29.4 PG — SIGNIFICANT CHANGE UP (ref 27–34)
MCHC RBC-ENTMCNC: 34.8 GM/DL — SIGNIFICANT CHANGE UP (ref 32–36)
MCV RBC AUTO: 84.5 FL — SIGNIFICANT CHANGE UP (ref 80–100)
MONOCYTES # BLD AUTO: 0.5 K/UL — SIGNIFICANT CHANGE UP (ref 0–0.9)
MONOCYTES NFR BLD AUTO: 6.1 % — SIGNIFICANT CHANGE UP (ref 2–14)
NEUTROPHILS # BLD AUTO: 6.39 K/UL — SIGNIFICANT CHANGE UP (ref 1.8–7.4)
NEUTROPHILS NFR BLD AUTO: 78.3 % — HIGH (ref 43–77)
PLATELET # BLD AUTO: 285 K/UL — SIGNIFICANT CHANGE UP (ref 150–400)
POTASSIUM SERPL-MCNC: 3.9 MMOL/L — SIGNIFICANT CHANGE UP (ref 3.5–5.3)
POTASSIUM SERPL-SCNC: 3.9 MMOL/L — SIGNIFICANT CHANGE UP (ref 3.5–5.3)
PROT SERPL-MCNC: 7.8 G/DL — SIGNIFICANT CHANGE UP (ref 6.6–8.7)
RBC # BLD: 5.23 M/UL — SIGNIFICANT CHANGE UP (ref 4.2–5.8)
RBC # FLD: 13 % — SIGNIFICANT CHANGE UP (ref 10.3–14.5)
SODIUM SERPL-SCNC: 139 MMOL/L — SIGNIFICANT CHANGE UP (ref 135–145)
TROPONIN T SERPL-MCNC: <0.01 NG/ML — SIGNIFICANT CHANGE UP (ref 0–0.06)
WBC # BLD: 8.16 K/UL — SIGNIFICANT CHANGE UP (ref 3.8–10.5)
WBC # FLD AUTO: 8.16 K/UL — SIGNIFICANT CHANGE UP (ref 3.8–10.5)

## 2022-10-18 PROCEDURE — 84484 ASSAY OF TROPONIN QUANT: CPT

## 2022-10-18 PROCEDURE — 80053 COMPREHEN METABOLIC PANEL: CPT

## 2022-10-18 PROCEDURE — 85379 FIBRIN DEGRADATION QUANT: CPT

## 2022-10-18 PROCEDURE — G1004: CPT

## 2022-10-18 PROCEDURE — 36415 COLL VENOUS BLD VENIPUNCTURE: CPT

## 2022-10-18 PROCEDURE — 96374 THER/PROPH/DIAG INJ IV PUSH: CPT | Mod: XU

## 2022-10-18 PROCEDURE — 71045 X-RAY EXAM CHEST 1 VIEW: CPT | Mod: 26

## 2022-10-18 PROCEDURE — 71275 CT ANGIOGRAPHY CHEST: CPT | Mod: ME

## 2022-10-18 PROCEDURE — 99284 EMERGENCY DEPT VISIT MOD MDM: CPT | Mod: 25

## 2022-10-18 PROCEDURE — 85025 COMPLETE CBC W/AUTO DIFF WBC: CPT

## 2022-10-18 PROCEDURE — 99285 EMERGENCY DEPT VISIT HI MDM: CPT

## 2022-10-18 PROCEDURE — 71045 X-RAY EXAM CHEST 1 VIEW: CPT

## 2022-10-18 PROCEDURE — 71275 CT ANGIOGRAPHY CHEST: CPT | Mod: 26,ME

## 2022-10-18 RX ORDER — KETOROLAC TROMETHAMINE 30 MG/ML
15 SYRINGE (ML) INJECTION ONCE
Refills: 0 | Status: DISCONTINUED | OUTPATIENT
Start: 2022-10-18 | End: 2022-10-18

## 2022-10-18 RX ORDER — METHOCARBAMOL 500 MG/1
1500 TABLET, FILM COATED ORAL ONCE
Refills: 0 | Status: COMPLETED | OUTPATIENT
Start: 2022-10-18 | End: 2022-10-18

## 2022-10-18 RX ADMIN — METHOCARBAMOL 1500 MILLIGRAM(S): 500 TABLET, FILM COATED ORAL at 15:14

## 2022-10-18 RX ADMIN — Medication 15 MILLIGRAM(S): at 15:15

## 2022-10-18 NOTE — ED PROVIDER NOTE - PATIENT PORTAL LINK FT
You can access the FollowMyHealth Patient Portal offered by [facility] by registering at the following website: [email or web address]. By joining [facility]’s FollowMyHealth portal, you will also be able to view your health information using other applications (apps) compatible with our system.

## 2022-10-18 NOTE — ED PROVIDER NOTE - OBJECTIVE STATEMENT
32 year old male with no pMH presents with R flank pain. the pt was seen here initially for this pain in May. CT showed intra-renal calculi, he followed up with Uro who advised that this was likely not the etiology of his pain, had negative study for gall stones, has been receiving OMT and has planned MR of T spine tomorrow. 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 His pain has been more persistent and severe over the past several day, worse with movement. It is associated with a burning and numbness along and just medial to his R scapula. He relayed this to one of the PAs that he works with, who performed an ekg on him in the office, told him it was abnormal and to go to the ED. He denies SOB, pain with deep inspiration, cough, LE edema, hemoptysis, dysuria, hematuria, nausea, vomiting, diarrhea.

## 2022-10-18 NOTE — ED ADULT TRIAGE NOTE - CHIEF COMPLAINT QUOTE
right sided cp "squeezing pain" onset last night; radiating to right scapula with + numbness . denies diaphoresis/sob/dizziness/n/v

## 2022-10-18 NOTE — ED PROVIDER NOTE - CLINICAL SUMMARY MEDICAL DECISION MAKING FREE TEXT BOX
I suspect the pain to be emanating from the thoracic spine. No PE, already worked up for renal and biliary colic, stable for dc supportive measures and MR tomorrow

## 2022-10-20 ENCOUNTER — NON-APPOINTMENT (OUTPATIENT)
Age: 32
End: 2022-10-20

## 2022-10-24 ENCOUNTER — APPOINTMENT (OUTPATIENT)
Dept: PHYSICAL MEDICINE AND REHAB | Facility: CLINIC | Age: 32
End: 2022-10-24

## 2022-10-27 ENCOUNTER — APPOINTMENT (OUTPATIENT)
Dept: NEUROSURGERY | Facility: CLINIC | Age: 32
End: 2022-10-27

## 2022-10-27 ENCOUNTER — APPOINTMENT (OUTPATIENT)
Dept: PHYSICAL MEDICINE AND REHAB | Facility: CLINIC | Age: 32
End: 2022-10-27

## 2022-10-27 VITALS
HEIGHT: 68 IN | SYSTOLIC BLOOD PRESSURE: 141 MMHG | WEIGHT: 220 LBS | DIASTOLIC BLOOD PRESSURE: 84 MMHG | BODY MASS INDEX: 33.34 KG/M2 | HEART RATE: 86 BPM

## 2022-10-27 VITALS
TEMPERATURE: 97.7 F | SYSTOLIC BLOOD PRESSURE: 147 MMHG | DIASTOLIC BLOOD PRESSURE: 85 MMHG | OXYGEN SATURATION: 99 % | HEART RATE: 73 BPM | HEIGHT: 68 IN | BODY MASS INDEX: 34.56 KG/M2 | WEIGHT: 228 LBS

## 2022-10-27 DIAGNOSIS — D17.79 BENIGN LIPOMATOUS NEOPLASM OF OTHER SITES: ICD-10-CM

## 2022-10-27 DIAGNOSIS — M99.04 SEGMENTAL AND SOMATIC DYSFUNCTION OF SACRAL REGION: ICD-10-CM

## 2022-10-27 DIAGNOSIS — M99.01 SEGMENTAL AND SOMATIC DYSFUNCTION OF CERVICAL REGION: ICD-10-CM

## 2022-10-27 PROCEDURE — 98929 OSTEOPATH MANJ 9-10 REGIONS: CPT

## 2022-10-27 PROCEDURE — 99204 OFFICE O/P NEW MOD 45 MIN: CPT

## 2022-10-27 RX ORDER — MELOXICAM 15 MG/1
15 TABLET ORAL
Qty: 30 | Refills: 0 | Status: COMPLETED | COMMUNITY
Start: 2021-09-08 | End: 2022-10-27

## 2022-10-27 RX ORDER — DICLOFENAC SODIUM 50 MG/1
50 TABLET, DELAYED RELEASE ORAL
Qty: 60 | Refills: 0 | Status: COMPLETED | COMMUNITY
Start: 2022-03-10 | End: 2022-10-27

## 2022-10-27 NOTE — PHYSICAL EXAM
[FreeTextEntry1] : Gen: Patient is A&O x 3, NAD\par HEENT: EOMI, hearing grossly normal\par Resp: regular, non - labored\par CV: pulses regular\par Skin: no rashes, erythema\par Lymph: no clubbing, cyanosis, edema, or palpable lymphadenopathy\par Inspection: no instability or misalignment\par ROM: full throughout\par Palpation: TTP right T9-T9 thoracic paraspinals, right periscapular muscles TTP, TTP right rib 6 anterior intercostal space  \par Sensation: intact to light touch\par Reflexes: 1+ and symmetric throughout\par Strength: 5/5 throughout\par Special tests: -Osullivan's sign, -Straight leg raise \par Gait: normal, non-antalgic\par \par Osteopathic Structural Exam:\par Cranial: OA rotated right\par Cervical: Right C3, C5 tenderpoint\par Thoracic: T9 ERRSBR\par Rib: Right anterior rib 6 tenderpoint \par Upper extremity: Right scapula myofascial restriction, Right levator scapula tenderpoint  \par Lumbar: Right posterior L4 tenderpoint \par Pelvis: Right medial innominate \par Sacrum: Right sacral rotation\par Lower Extremity: RLE restricted in internal rotation \par \par \par \par \par

## 2022-10-27 NOTE — HISTORY OF PRESENT ILLNESS
[FreeTextEntry1] : Mr. Young is a 32-year-old male with no significant past medical history presenting for evaluation.  He reports for over a year he has had significant pain in his right upper flank region.  Describes the pain as being tender to palpation with sometimes radiation.  Denies any overt weakness numbness tingling bowel bladder dysfunction.  He has had extensive work-up including in the ED, with urology with [facility] sports medicine.  In the past he has had CT and evaluation with GI as well as MRIs of the lumbar spine.  He reports that no overt cause can be identified. \par \par Interval history:\par He reports that he had significant improvement with OMT at his last visit and had almost complete relief of pain.  However he went golfing and states that this severely exacerbated his pain.  He was given steroids and said that this helped decrease the pain.  Still reports the worst pain is radiation across his right flank and rib region.  Denies any weakness bowel bladder dysfunction or numbness or tingling.
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
## 2022-10-27 NOTE — ASSESSMENT
[FreeTextEntry1] : 32 year old male presenting for evaluation.\par \par #Flank pain/thoracic pain:\par -MRI results reviewed with patient.\par -Given persistence/severity of symptoms will obtain opinion from Neurosurgery, will also consider interventional spine consultation \par -He continues to report that OMT is providing significant relief to his symptoms and he would like to continue with OMT\par -Osteopathic structural exam demonstrated somatic dysfunction and the patient agreed to osteopathic manipulation.\par  \par 1. Somatic dysfunction cranial\par -OMT performed with myofascial release\par 2. Somatic dysfunction cervical\par --OMT performed with counterstrain \par 3. Somatic dysfunction upper extremity\par --OMT performed with myofascial release\par 4. Somatic dysfunction rib\par --OMT performed with counterstrain\par 5. Somatic dysfunction thoracic \par --OMT performed with muscle energy \par 6. Somatic dysfunction lumbar \par --OMT performed with counterstrain\par 7. Somatic dysfunction pelvis\par --OMT performed with myofascial release  \par 8. Somatic dysfunction sacrum\par --OMT performed with myofascial release \par 9. Somatic dysfunction lower extremity \par --OMT performed with myofascial release\par \par Patient tolerated treatment well.\par \par Follow up in 1-2 weeks.  \par

## 2022-11-02 ENCOUNTER — APPOINTMENT (OUTPATIENT)
Dept: PHYSICAL MEDICINE AND REHAB | Facility: CLINIC | Age: 32
End: 2022-11-02

## 2022-11-02 DIAGNOSIS — M99.05 SEGMENTAL AND SOMATIC DYSFUNCTION OF PELVIC REGION: ICD-10-CM

## 2022-11-02 DIAGNOSIS — M99.06 SEGMENTAL AND SOMATIC DYSFUNCTION OF LOWER EXTREMITY: ICD-10-CM

## 2022-11-02 DIAGNOSIS — M99.03 SEGMENTAL AND SOMATIC DYSFUNCTION OF LUMBAR REGION: ICD-10-CM

## 2022-11-02 DIAGNOSIS — M99.08 SEGMENTAL AND SOMATIC DYSFUNCTION OF RIB CAGE: ICD-10-CM

## 2022-11-02 DIAGNOSIS — M99.02 SEGMENTAL AND SOMATIC DYSFUNCTION OF THORACIC REGION: ICD-10-CM

## 2022-11-02 DIAGNOSIS — M99.00 SEGMENTAL AND SOMATIC DYSFUNCTION OF HEAD REGION: ICD-10-CM

## 2022-11-02 DIAGNOSIS — M99.07 SEGMENTAL AND SOMATIC DYSFUNCTION OF UPPER EXTREMITY: ICD-10-CM

## 2022-11-02 PROCEDURE — 98928 OSTEOPATH MANJ 7-8 REGIONS: CPT

## 2022-11-02 PROCEDURE — 99214 OFFICE O/P EST MOD 30 MIN: CPT | Mod: 25

## 2022-11-02 RX ORDER — CARISOPRODOL 250 MG/1
250 TABLET ORAL 4 TIMES DAILY
Qty: 60 | Refills: 0 | Status: DISCONTINUED | COMMUNITY
Start: 2022-10-27 | End: 2022-11-02

## 2022-11-02 RX ORDER — METHOCARBAMOL 750 MG/1
750 TABLET, FILM COATED ORAL
Qty: 60 | Refills: 0 | Status: DISCONTINUED | COMMUNITY
Start: 2022-05-06

## 2022-11-02 RX ORDER — CEPHALEXIN 500 MG/1
500 CAPSULE ORAL
Qty: 20 | Refills: 0 | Status: DISCONTINUED | COMMUNITY
Start: 2022-07-24

## 2022-11-02 RX ORDER — METHYLPREDNISOLONE 4 MG/1
4 TABLET ORAL
Qty: 21 | Refills: 0 | Status: DISCONTINUED | COMMUNITY
Start: 2022-10-21

## 2022-11-02 NOTE — DATA REVIEWED
[FreeTextEntry1] : MRI thoracic spine October 2022: Mild thoracic levoscoliosis.  Epidermal lipomatosis.

## 2022-11-02 NOTE — PHYSICAL EXAM
[FreeTextEntry1] : Gen: Patient is A&O x 3, NAD\par HEENT: EOMI, hearing grossly normal\par Resp: regular, non - labored\par CV: pulses regular\par Skin: no rashes, erythema\par Lymph: no clubbing, cyanosis, edema, or palpable lymphadenopathy\par Inspection: no instability or misalignment\par ROM: full throughout\par Palpation: TTP right T7-T9 thoracic paraspinals, right periscapular muscles TTP, TTP right rib 7 anterior intercostal space  \par Sensation: intact to light touch\par Reflexes: 1+ and symmetric throughout\par Strength: 5/5 throughout\par Special tests: -Osullivan's sign, -Straight leg raise \par Gait: normal, non-antalgic\par \par Osteopathic Structural Exam:\par Cranial: OA rotated right\par Thoracic: T9 ERRSBR\par Rib: Right anterior rib 7 tenderpoint \par Upper extremity: Right scapula myofascial restriction, Right levator scapula tenderpoint  \par Lumbar: Right posterior L3 tenderpoint \par Pelvis: Right medial innominate \par Lower Extremity: RLE restricted in internal rotation \par \par \par \par \par

## 2022-11-02 NOTE — HISTORY OF PRESENT ILLNESS
[FreeTextEntry1] : Mr. Young is a 32-year-old male with no significant past medical history presenting for evaluation.  He reports for over a year he has had significant pain in his right upper flank region.  Describes the pain as being tender to palpation with sometimes radiation.  Denies any overt weakness numbness tingling bowel bladder dysfunction.  He has had extensive work-up including in the ED, with urology with [facility].  In the past he has had CT and evaluation with GI as well as MRIs of the lumbar spine.  He reports that no overt cause can be identified. \par \par Interval history:\par He reports he stills feeling severe and persistent pain in his right chest wall and periscapular/thoracic region.  Describes like a vice  in the area.  He has previously had multiple evaluations in the ER with multiple scans.  Recently saw neurosurgery for evaluation.  Reports OMT does help but sometimes the symptoms return.  Denies any overt new weakness numbness ting or bowel bladder dysfunction.

## 2022-11-02 NOTE — ASSESSMENT
[FreeTextEntry1] : 32 year old male presenting for evaluation.\par \par #Neuropathic pain:\par -Start duloxetine 30mg daily\par \par #Low back pain:\par -Evaluated by Neurosurgery, recommended to start PT\par -Start physical therapy for core strengthening, modalities and stretching \par \par #Myofascial pain:\par -No over neurologic deficits on exam \par -Osteopathic structural exam demonstrated somatic dysfunction and the patient agreed to osteopathic manipulation.\par  \par 1. Somatic dysfunction cranial\par -OMT performed with myofascial release\par 2. Somatic dysfunction upper extremity\par --OMT performed with myofascial release\par 3. Somatic dysfunction rib\par --OMT performed with counterstrain\par 4. Somatic dysfunction thoracic \par --OMT performed with muscle energy \par 5. Somatic dysfunction lumbar \par --OMT performed with counterstrain\par 6. Somatic dysfunction pelvis\par --OMT performed with myofascial release  \par 7. Somatic dysfunction lower extremity \par --OMT performed with myofascial release\par \par Patient tolerated treatment well.\par \par Follow up in 1-2 weeks.  \par

## 2022-11-25 ENCOUNTER — APPOINTMENT (OUTPATIENT)
Dept: PHYSICAL MEDICINE AND REHAB | Facility: CLINIC | Age: 32
End: 2022-11-25

## 2023-01-09 ENCOUNTER — APPOINTMENT (OUTPATIENT)
Dept: FAMILY MEDICINE | Facility: CLINIC | Age: 33
End: 2023-01-09
Payer: COMMERCIAL

## 2023-01-09 VITALS
OXYGEN SATURATION: 98 % | BODY MASS INDEX: 36.98 KG/M2 | HEIGHT: 68 IN | SYSTOLIC BLOOD PRESSURE: 130 MMHG | HEART RATE: 78 BPM | DIASTOLIC BLOOD PRESSURE: 80 MMHG | TEMPERATURE: 98 F | WEIGHT: 244 LBS

## 2023-01-09 DIAGNOSIS — Z83.3 FAMILY HISTORY OF DIABETES MELLITUS: ICD-10-CM

## 2023-01-09 DIAGNOSIS — Z76.89 PERSONS ENCOUNTERING HEALTH SERVICES IN OTHER SPECIFIED CIRCUMSTANCES: ICD-10-CM

## 2023-01-09 DIAGNOSIS — Z82.49 FAMILY HISTORY OF ISCHEMIC HEART DISEASE AND OTHER DISEASES OF THE CIRCULATORY SYSTEM: ICD-10-CM

## 2023-01-09 DIAGNOSIS — Z78.9 OTHER SPECIFIED HEALTH STATUS: ICD-10-CM

## 2023-01-09 PROCEDURE — G0444 DEPRESSION SCREEN ANNUAL: CPT | Mod: 59

## 2023-01-09 PROCEDURE — 99385 PREV VISIT NEW AGE 18-39: CPT | Mod: 25

## 2023-01-09 RX ORDER — CELECOXIB 200 MG/1
200 CAPSULE ORAL TWICE DAILY
Qty: 60 | Refills: 0 | Status: DISCONTINUED | COMMUNITY
Start: 2022-10-27 | End: 2023-01-09

## 2023-01-09 RX ORDER — DULOXETINE HYDROCHLORIDE 30 MG/1
30 CAPSULE, DELAYED RELEASE PELLETS ORAL
Qty: 30 | Refills: 2 | Status: DISCONTINUED | COMMUNITY
Start: 2022-11-02 | End: 2023-01-09

## 2023-01-10 ENCOUNTER — APPOINTMENT (OUTPATIENT)
Age: 33
End: 2023-01-10

## 2023-01-10 LAB
25(OH)D3 SERPL-MCNC: 30.1 NG/ML
ALBUMIN SERPL ELPH-MCNC: 4.5 G/DL
ALP BLD-CCNC: 74 U/L
ALT SERPL-CCNC: 73 U/L
ANION GAP SERPL CALC-SCNC: 12 MMOL/L
APPEARANCE: CLEAR
AST SERPL-CCNC: 36 U/L
BACTERIA: NEGATIVE
BASOPHILS # BLD AUTO: 0.02 K/UL
BASOPHILS NFR BLD AUTO: 0.3 %
BILIRUB SERPL-MCNC: 0.2 MG/DL
BILIRUBIN URINE: NEGATIVE
BLOOD URINE: NEGATIVE
BUN SERPL-MCNC: 15 MG/DL
CALCIUM SERPL-MCNC: 9.2 MG/DL
CHLORIDE SERPL-SCNC: 105 MMOL/L
CHOLEST SERPL-MCNC: 163 MG/DL
CO2 SERPL-SCNC: 24 MMOL/L
COLOR: NORMAL
CREAT SERPL-MCNC: 0.85 MG/DL
EGFR: 119 ML/MIN/1.73M2
EOSINOPHIL # BLD AUTO: 0.13 K/UL
EOSINOPHIL NFR BLD AUTO: 2.2 %
ESTIMATED AVERAGE GLUCOSE: 111 MG/DL
GLUCOSE QUALITATIVE U: NEGATIVE
GLUCOSE SERPL-MCNC: 109 MG/DL
HBA1C MFR BLD HPLC: 5.5 %
HCT VFR BLD CALC: 48.4 %
HDLC SERPL-MCNC: 50 MG/DL
HGB BLD-MCNC: 15.9 G/DL
HYALINE CASTS: 0 /LPF
IMM GRANULOCYTES NFR BLD AUTO: 0.3 %
KETONES URINE: NEGATIVE
LDLC SERPL CALC-MCNC: 101 MG/DL
LEUKOCYTE ESTERASE URINE: NEGATIVE
LYMPHOCYTES # BLD AUTO: 1.49 K/UL
LYMPHOCYTES NFR BLD AUTO: 25.1 %
MAN DIFF?: NORMAL
MCHC RBC-ENTMCNC: 29.1 PG
MCHC RBC-ENTMCNC: 32.9 GM/DL
MCV RBC AUTO: 88.5 FL
MICROSCOPIC-UA: NORMAL
MONOCYTES # BLD AUTO: 0.5 K/UL
MONOCYTES NFR BLD AUTO: 8.4 %
NEUTROPHILS # BLD AUTO: 3.77 K/UL
NEUTROPHILS NFR BLD AUTO: 63.7 %
NITRITE URINE: NEGATIVE
NONHDLC SERPL-MCNC: 114 MG/DL
PH URINE: 7
PLATELET # BLD AUTO: 268 K/UL
POTASSIUM SERPL-SCNC: 4.7 MMOL/L
PROT SERPL-MCNC: 7.3 G/DL
PROTEIN URINE: NEGATIVE
RBC # BLD: 5.47 M/UL
RBC # FLD: 13.9 %
RED BLOOD CELLS URINE: 0 /HPF
SODIUM SERPL-SCNC: 141 MMOL/L
SPECIFIC GRAVITY URINE: 1.01
SQUAMOUS EPITHELIAL CELLS: 0 /HPF
TRIGL SERPL-MCNC: 60 MG/DL
TSH SERPL-ACNC: 1.18 UIU/ML
URATE SERPL-MCNC: 6.5 MG/DL
UROBILINOGEN URINE: NORMAL
WBC # FLD AUTO: 5.93 K/UL
WHITE BLOOD CELLS URINE: 0 /HPF

## 2023-01-10 NOTE — PHYSICAL EXAM
[Normal Sclera/Conjunctiva] : normal sclera/conjunctiva [PERRL] : pupils equal round and reactive to light [EOMI] : extraocular movements intact [No Carotid Bruits] : no carotid bruits [Pedal Pulses Present] : the pedal pulses are present [No Edema] : there was no peripheral edema [Normal] : soft, non-tender, non-distended, no masses palpated, no HSM and normal bowel sounds [Grossly Normal Strength/Tone] : grossly normal strength/tone [No Rash] : no rash [Normal Gait] : normal gait [Normal Affect] : the affect was normal [Normal Insight/Judgement] : insight and judgment were intact

## 2023-01-11 ENCOUNTER — NON-APPOINTMENT (OUTPATIENT)
Age: 33
End: 2023-01-11

## 2023-01-11 NOTE — HEALTH RISK ASSESSMENT
[Good] : ~his/her~  mood as  good [Never] : Never [Yes] : Yes [2 - 3 times a week (3 pts)] : 2 - 3  times a week (3 points) [3 or 4 (1 pt)] : 3 or 4  (1 point) [Never (0 pts)] : Never (0 points) [0] : 2) Feeling down, depressed, or hopeless: Not at all (0) [PHQ-2 Negative - No further assessment needed] : PHQ-2 Negative - No further assessment needed [I have developed a follow-up plan documented below in the note.] : I have developed a follow-up plan documented below in the note. [None] : None [With Significant Other] : lives with significant other [Employed] : employed [] :  [# Of Children ___] : has [unfilled] children [Feels Safe at Home] : Feels safe at home [Fully functional (bathing, dressing, toileting, transferring, walking, feeding)] : Fully functional (bathing, dressing, toileting, transferring, walking, feeding) [Fully functional (using the telephone, shopping, preparing meals, housekeeping, doing laundry, using] : Fully functional and needs no help or supervision to perform IADLs (using the telephone, shopping, preparing meals, housekeeping, doing laundry, using transportation, managing medications and managing finances) [Audit-CScore] : 4 [WZO3Fpozo] : 0 [

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
## 2023-01-11 NOTE — PLAN
[FreeTextEntry1] : Start Duloxetine 20mg daily\par Continue Xanax PRN \par Start Ozempic - if covered by insurance \par BW drawn today\par Encourage well balanced diet, 150 minutes of physical activity a week \par f/u 1 month

## 2023-01-16 ENCOUNTER — TRANSCRIPTION ENCOUNTER (OUTPATIENT)
Age: 33
End: 2023-01-16

## 2023-01-17 ENCOUNTER — APPOINTMENT (OUTPATIENT)
Dept: NEUROSURGERY | Facility: CLINIC | Age: 33
End: 2023-01-17

## 2023-01-18 ENCOUNTER — APPOINTMENT (OUTPATIENT)
Dept: UROLOGY | Facility: CLINIC | Age: 33
End: 2023-01-18
Payer: COMMERCIAL

## 2023-01-18 PROCEDURE — 99214 OFFICE O/P EST MOD 30 MIN: CPT

## 2023-01-18 NOTE — ASSESSMENT
[FreeTextEntry1] : \par plan:\par - right ureteroscopy\par - will need future metabolic evaluation\par - might need to consult with pain clinic in the future\par \par

## 2023-01-18 NOTE — HISTORY OF PRESENT ILLNESS
[FreeTextEntry1] : 34 yo M for follow up\par healthy\par NKDA\par \par known history of stone in the right kidney\par previously had left ureteroscopy\par \par new ultrasound: no hydro, 5 mm in right kidney, no stones in left\par has intermittent right flank pain, lasts several hours, no fever, no hematuria\par last CT 07/2022: only scattered punctate stones in the right kidney\par \par discussed the findings with the patient\par not sure it is a single 5 mm stone as described in the ultrasound or small stones next to each other.\par not sure if the pain described is due to the stones, and may be something else.\par  there is a chance the stones are shifting sometimes and obstructing a single infundibulum intermittently\par \par after discussing option, the patient is interested in ureteroscopy to treat the stones and make sure there are no obstructions. understands this may not relieve the pain.\par discussed ureteroscopy\par Risks, benefits and alternatives discussed. Procedure, in hospital stay and recovery explained. Risk of infection, multiple procedures, ureteral injury, ureteral stricture, incomplete stone clearance, sepsis, bleeding, and retention, all discussed.\par  \par \par plan:\par - right ureteroscopy\par - will need future metabolic evaluation\par - might need to consult with pain clinic in the future\par

## 2023-01-24 ENCOUNTER — NON-APPOINTMENT (OUTPATIENT)
Age: 33
End: 2023-01-24

## 2023-01-25 ENCOUNTER — OUTPATIENT (OUTPATIENT)
Dept: OUTPATIENT SERVICES | Facility: HOSPITAL | Age: 33
LOS: 1 days | End: 2023-01-25
Payer: COMMERCIAL

## 2023-01-25 ENCOUNTER — TRANSCRIPTION ENCOUNTER (OUTPATIENT)
Age: 33
End: 2023-01-25

## 2023-01-25 VITALS
DIASTOLIC BLOOD PRESSURE: 84 MMHG | HEIGHT: 68 IN | SYSTOLIC BLOOD PRESSURE: 118 MMHG | HEART RATE: 64 BPM | RESPIRATION RATE: 18 BRPM | OXYGEN SATURATION: 97 % | TEMPERATURE: 97 F | WEIGHT: 237.44 LBS

## 2023-01-25 DIAGNOSIS — Z91.89 OTHER SPECIFIED PERSONAL RISK FACTORS, NOT ELSEWHERE CLASSIFIED: ICD-10-CM

## 2023-01-25 DIAGNOSIS — N20.0 CALCULUS OF KIDNEY: ICD-10-CM

## 2023-01-25 DIAGNOSIS — Z01.818 ENCOUNTER FOR OTHER PREPROCEDURAL EXAMINATION: ICD-10-CM

## 2023-01-25 LAB
A1C WITH ESTIMATED AVERAGE GLUCOSE RESULT: 5.5 % — SIGNIFICANT CHANGE UP (ref 4–5.6)
ALBUMIN SERPL ELPH-MCNC: 4.4 G/DL — SIGNIFICANT CHANGE UP (ref 3.3–5.2)
ALP SERPL-CCNC: 73 U/L — SIGNIFICANT CHANGE UP (ref 40–120)
ALT FLD-CCNC: 54 U/L — HIGH
ANION GAP SERPL CALC-SCNC: 8 MMOL/L — SIGNIFICANT CHANGE UP (ref 5–17)
APPEARANCE UR: CLEAR — SIGNIFICANT CHANGE UP
APTT BLD: 30.8 SEC — SIGNIFICANT CHANGE UP (ref 27.5–35.5)
AST SERPL-CCNC: 31 U/L — SIGNIFICANT CHANGE UP
BASOPHILS # BLD AUTO: 0.02 K/UL — SIGNIFICANT CHANGE UP (ref 0–0.2)
BASOPHILS NFR BLD AUTO: 0.3 % — SIGNIFICANT CHANGE UP (ref 0–2)
BILIRUB SERPL-MCNC: 0.3 MG/DL — LOW (ref 0.4–2)
BILIRUB UR-MCNC: NEGATIVE — SIGNIFICANT CHANGE UP
BUN SERPL-MCNC: 15.3 MG/DL — SIGNIFICANT CHANGE UP (ref 8–20)
CALCIUM SERPL-MCNC: 9.2 MG/DL — SIGNIFICANT CHANGE UP (ref 8.4–10.5)
CHLORIDE SERPL-SCNC: 105 MMOL/L — SIGNIFICANT CHANGE UP (ref 96–108)
CO2 SERPL-SCNC: 26 MMOL/L — SIGNIFICANT CHANGE UP (ref 22–29)
COLOR SPEC: YELLOW — SIGNIFICANT CHANGE UP
CREAT SERPL-MCNC: 0.9 MG/DL — SIGNIFICANT CHANGE UP (ref 0.5–1.3)
DIFF PNL FLD: NEGATIVE — SIGNIFICANT CHANGE UP
EGFR: 117 ML/MIN/1.73M2 — SIGNIFICANT CHANGE UP
EOSINOPHIL # BLD AUTO: 0.16 K/UL — SIGNIFICANT CHANGE UP (ref 0–0.5)
EOSINOPHIL NFR BLD AUTO: 2.6 % — SIGNIFICANT CHANGE UP (ref 0–6)
ESTIMATED AVERAGE GLUCOSE: 111 MG/DL — SIGNIFICANT CHANGE UP (ref 68–114)
GLUCOSE SERPL-MCNC: 116 MG/DL — HIGH (ref 70–99)
GLUCOSE UR QL: NEGATIVE MG/DL — SIGNIFICANT CHANGE UP
HCT VFR BLD CALC: 45.8 % — SIGNIFICANT CHANGE UP (ref 39–50)
HGB BLD-MCNC: 15.2 G/DL — SIGNIFICANT CHANGE UP (ref 13–17)
IMM GRANULOCYTES NFR BLD AUTO: 0.3 % — SIGNIFICANT CHANGE UP (ref 0–0.9)
INR BLD: 1.02 RATIO — SIGNIFICANT CHANGE UP (ref 0.88–1.16)
KETONES UR-MCNC: NEGATIVE — SIGNIFICANT CHANGE UP
LEUKOCYTE ESTERASE UR-ACNC: NEGATIVE — SIGNIFICANT CHANGE UP
LYMPHOCYTES # BLD AUTO: 1.13 K/UL — SIGNIFICANT CHANGE UP (ref 1–3.3)
LYMPHOCYTES # BLD AUTO: 18.6 % — SIGNIFICANT CHANGE UP (ref 13–44)
MCHC RBC-ENTMCNC: 28.5 PG — SIGNIFICANT CHANGE UP (ref 27–34)
MCHC RBC-ENTMCNC: 33.2 GM/DL — SIGNIFICANT CHANGE UP (ref 32–36)
MCV RBC AUTO: 85.8 FL — SIGNIFICANT CHANGE UP (ref 80–100)
MONOCYTES # BLD AUTO: 0.47 K/UL — SIGNIFICANT CHANGE UP (ref 0–0.9)
MONOCYTES NFR BLD AUTO: 7.7 % — SIGNIFICANT CHANGE UP (ref 2–14)
NEUTROPHILS # BLD AUTO: 4.27 K/UL — SIGNIFICANT CHANGE UP (ref 1.8–7.4)
NEUTROPHILS NFR BLD AUTO: 70.5 % — SIGNIFICANT CHANGE UP (ref 43–77)
NITRITE UR-MCNC: NEGATIVE — SIGNIFICANT CHANGE UP
PH UR: 6 — SIGNIFICANT CHANGE UP (ref 5–8)
PLATELET # BLD AUTO: 255 K/UL — SIGNIFICANT CHANGE UP (ref 150–400)
POTASSIUM SERPL-MCNC: 4.7 MMOL/L — SIGNIFICANT CHANGE UP (ref 3.5–5.3)
POTASSIUM SERPL-SCNC: 4.7 MMOL/L — SIGNIFICANT CHANGE UP (ref 3.5–5.3)
PROT SERPL-MCNC: 7.7 G/DL — SIGNIFICANT CHANGE UP (ref 6.6–8.7)
PROT UR-MCNC: NEGATIVE — SIGNIFICANT CHANGE UP
PROTHROM AB SERPL-ACNC: 11.8 SEC — SIGNIFICANT CHANGE UP (ref 10.5–13.4)
RBC # BLD: 5.34 M/UL — SIGNIFICANT CHANGE UP (ref 4.2–5.8)
RBC # FLD: 13.2 % — SIGNIFICANT CHANGE UP (ref 10.3–14.5)
SARS-COV-2 RNA SPEC QL NAA+PROBE: SIGNIFICANT CHANGE UP
SODIUM SERPL-SCNC: 139 MMOL/L — SIGNIFICANT CHANGE UP (ref 135–145)
SP GR SPEC: 1.02 — SIGNIFICANT CHANGE UP (ref 1.01–1.02)
UROBILINOGEN FLD QL: NEGATIVE MG/DL — SIGNIFICANT CHANGE UP
WBC # BLD: 6.07 K/UL — SIGNIFICANT CHANGE UP (ref 3.8–10.5)
WBC # FLD AUTO: 6.07 K/UL — SIGNIFICANT CHANGE UP (ref 3.8–10.5)

## 2023-01-25 NOTE — H&P PST ADULT - HISTORY OF PRESENT ILLNESS
34 yo male with a 2 year history of right flank/upper quadrant pain. patient states the pain in that area is at its worst 8/10 with nausea. He states he is unsure what facilitates or increases the pain and it is constant. he said it starts off as a dull ache and will get so bad that he goes to the ER. Nothing including RX will not relieve his symptoms.   He denies any bowel issues or urinary s/s. He denies any blood in the urine. Over the course of the two years he has seen his primary care MD, neurosurgeon, ortho and has multiple scans. Hida scan, endo, Multiple xrays, abdominal MRI, Back MRI, CT scans and so far they found the kidney stones. "He state 3 on the right and 2 in the left." He currently sitting about 3-4/10 in pain now .   He is scheduled for a right ureteroscopy/ lithotripsy with Dr. ALAMO    Patient educated on surgical scrub, COVID testing done in pst, preadmission instructions, and day of procedure medications, verbalizes understanding..

## 2023-01-25 NOTE — H&P PST ADULT - GASTROINTESTINAL
details… soft/nondistended/normal active bowel sounds/no organomegaly/no palpable angela/no masses palpable/tender

## 2023-01-26 ENCOUNTER — TRANSCRIPTION ENCOUNTER (OUTPATIENT)
Age: 33
End: 2023-01-26

## 2023-01-26 ENCOUNTER — APPOINTMENT (OUTPATIENT)
Dept: UROLOGY | Facility: HOSPITAL | Age: 33
End: 2023-01-26

## 2023-01-26 ENCOUNTER — OUTPATIENT (OUTPATIENT)
Dept: OUTPATIENT SERVICES | Facility: HOSPITAL | Age: 33
LOS: 1 days | End: 2023-01-26
Payer: COMMERCIAL

## 2023-01-26 VITALS
HEART RATE: 78 BPM | OXYGEN SATURATION: 97 % | TEMPERATURE: 98 F | RESPIRATION RATE: 13 BRPM | DIASTOLIC BLOOD PRESSURE: 85 MMHG | SYSTOLIC BLOOD PRESSURE: 138 MMHG

## 2023-01-26 VITALS
SYSTOLIC BLOOD PRESSURE: 144 MMHG | WEIGHT: 235.89 LBS | RESPIRATION RATE: 15 BRPM | HEIGHT: 68 IN | OXYGEN SATURATION: 99 % | DIASTOLIC BLOOD PRESSURE: 84 MMHG | HEART RATE: 78 BPM | TEMPERATURE: 98 F

## 2023-01-26 DIAGNOSIS — N20.0 CALCULUS OF KIDNEY: ICD-10-CM

## 2023-01-26 LAB
CULTURE RESULTS: SIGNIFICANT CHANGE UP
SPECIMEN SOURCE: SIGNIFICANT CHANGE UP

## 2023-01-26 PROCEDURE — 80053 COMPREHEN METABOLIC PANEL: CPT

## 2023-01-26 PROCEDURE — C2617: CPT

## 2023-01-26 PROCEDURE — 81003 URINALYSIS AUTO W/O SCOPE: CPT

## 2023-01-26 PROCEDURE — 76000 FLUOROSCOPY <1 HR PHYS/QHP: CPT

## 2023-01-26 PROCEDURE — G0463: CPT

## 2023-01-26 PROCEDURE — 52356 CYSTO/URETERO W/LITHOTRIPSY: CPT | Mod: RT

## 2023-01-26 PROCEDURE — 85610 PROTHROMBIN TIME: CPT

## 2023-01-26 PROCEDURE — 83036 HEMOGLOBIN GLYCOSYLATED A1C: CPT

## 2023-01-26 PROCEDURE — 74420 UROGRAPHY RTRGR +-KUB: CPT | Mod: 26,RT

## 2023-01-26 PROCEDURE — 85025 COMPLETE CBC W/AUTO DIFF WBC: CPT

## 2023-01-26 PROCEDURE — 87086 URINE CULTURE/COLONY COUNT: CPT

## 2023-01-26 PROCEDURE — 85730 THROMBOPLASTIN TIME PARTIAL: CPT

## 2023-01-26 PROCEDURE — C1766: CPT

## 2023-01-26 PROCEDURE — U0003: CPT

## 2023-01-26 PROCEDURE — U0005: CPT

## 2023-01-26 PROCEDURE — C1769: CPT

## 2023-01-26 PROCEDURE — C1889: CPT

## 2023-01-26 PROCEDURE — 36415 COLL VENOUS BLD VENIPUNCTURE: CPT

## 2023-01-26 DEVICE — GUIDEWIRE SENSOR DUAL-FLEX NITINOL STRAIGHT .035" X 150CM: Type: IMPLANTABLE DEVICE | Site: RIGHT | Status: FUNCTIONAL

## 2023-01-26 DEVICE — URETERAL STENT PERCUFLEX PLUS 7FR 26CM: Type: IMPLANTABLE DEVICE | Site: RIGHT | Status: FUNCTIONAL

## 2023-01-26 DEVICE — URETERAL SHEATH NAVIGATOR HD 11/13FR X 46CM: Type: IMPLANTABLE DEVICE | Site: RIGHT | Status: FUNCTIONAL

## 2023-01-26 DEVICE — LASER FIBER SOLTIVE 200 BALL TIP: Type: IMPLANTABLE DEVICE | Site: RIGHT | Status: FUNCTIONAL

## 2023-01-26 RX ORDER — PHENAZOPYRIDINE HCL 100 MG
1 TABLET ORAL
Qty: 9 | Refills: 0
Start: 2023-01-26 | End: 2023-01-28

## 2023-01-26 RX ORDER — OXYBUTYNIN CHLORIDE 5 MG
1 TABLET ORAL
Qty: 30 | Refills: 0
Start: 2023-01-26 | End: 2023-02-09

## 2023-01-26 RX ORDER — OXYBUTYNIN CHLORIDE 5 MG
5 TABLET ORAL ONCE
Refills: 0 | Status: COMPLETED | OUTPATIENT
Start: 2023-01-26 | End: 2023-01-26

## 2023-01-26 RX ORDER — FENTANYL CITRATE 50 UG/ML
50 INJECTION INTRAVENOUS
Refills: 0 | Status: DISCONTINUED | OUTPATIENT
Start: 2023-01-26 | End: 2023-01-26

## 2023-01-26 RX ORDER — SODIUM CHLORIDE 9 MG/ML
3 INJECTION INTRAMUSCULAR; INTRAVENOUS; SUBCUTANEOUS ONCE
Refills: 0 | Status: DISCONTINUED | OUTPATIENT
Start: 2023-01-26 | End: 2023-01-26

## 2023-01-26 RX ORDER — ACETAMINOPHEN 500 MG
975 TABLET ORAL ONCE
Refills: 0 | Status: COMPLETED | OUTPATIENT
Start: 2023-01-26 | End: 2023-01-26

## 2023-01-26 RX ORDER — CEFAZOLIN SODIUM 1 G
2000 VIAL (EA) INJECTION ONCE
Refills: 0 | Status: COMPLETED | OUTPATIENT
Start: 2023-01-26 | End: 2023-01-26

## 2023-01-26 RX ORDER — KETOROLAC TROMETHAMINE 30 MG/ML
30 SYRINGE (ML) INJECTION ONCE
Refills: 0 | Status: DISCONTINUED | OUTPATIENT
Start: 2023-01-26 | End: 2023-01-26

## 2023-01-26 RX ORDER — IBUPROFEN 200 MG
1 TABLET ORAL
Qty: 16 | Refills: 0
Start: 2023-01-26 | End: 2023-01-29

## 2023-01-26 RX ORDER — CEFAZOLIN SODIUM 1 G
2000 VIAL (EA) INJECTION ONCE
Refills: 0 | Status: DISCONTINUED | OUTPATIENT
Start: 2023-01-26 | End: 2023-01-26

## 2023-01-26 RX ORDER — SODIUM CHLORIDE 9 MG/ML
1000 INJECTION, SOLUTION INTRAVENOUS
Refills: 0 | Status: DISCONTINUED | OUTPATIENT
Start: 2023-01-26 | End: 2023-01-26

## 2023-01-26 RX ORDER — TAMSULOSIN HYDROCHLORIDE 0.4 MG/1
1 CAPSULE ORAL
Qty: 30 | Refills: 0
Start: 2023-01-26 | End: 2023-02-24

## 2023-01-26 RX ADMIN — Medication 2000 MILLIGRAM(S): at 10:25

## 2023-01-26 RX ADMIN — Medication 5 MILLIGRAM(S): at 12:47

## 2023-01-26 RX ADMIN — Medication 30 MILLIGRAM(S): at 11:50

## 2023-01-26 RX ADMIN — Medication 975 MILLIGRAM(S): at 08:31

## 2023-01-26 NOTE — ASU DISCHARGE PLAN (ADULT/PEDIATRIC) - CARE PROVIDER_API CALL
Jose Benjamin)  Urology  [address], [address]  [place] [address]  Phone: [phone]  Fax: [phone]  Follow Up Time:

## 2023-01-30 ENCOUNTER — NON-APPOINTMENT (OUTPATIENT)
Age: 33
End: 2023-01-30

## 2023-01-30 ENCOUNTER — APPOINTMENT (OUTPATIENT)
Dept: UROLOGY | Facility: CLINIC | Age: 33
End: 2023-01-30
Payer: COMMERCIAL

## 2023-01-30 VITALS — DIASTOLIC BLOOD PRESSURE: 97 MMHG | HEART RATE: 63 BPM | SYSTOLIC BLOOD PRESSURE: 150 MMHG

## 2023-01-30 PROCEDURE — 99213 OFFICE O/P EST LOW 20 MIN: CPT

## 2023-01-30 NOTE — ASSESSMENT
[FreeTextEntry1] : \par plan:\par - fluids, pain medication as needed\par - next visit in 2 months with 245 hr and office ultrasound

## 2023-01-30 NOTE — HISTORY OF PRESENT ILLNESS
[FreeTextEntry1] : s/p right ureteroscopy last week\par was with stent on string\par he could not tolerate the stent anymore and removed it today\par after he had severe right flank pain and here for follow up\par \par took ibuprofen and tylenol\par now feeling better, still not back to normal\par \par instructed to drink a lot of fluids\par discussed the possibility of obstruction either due to inflammation or stone fragments\par discussed the need to go to the ER for any fever\par \par also discussed followup of 24 hr urine collection and renal ultrasound\par \par plan:\par - fluids, pain medication as needed\par - next visit in 2 months with 245 hr and office ultrasound

## 2023-01-31 ENCOUNTER — NON-APPOINTMENT (OUTPATIENT)
Age: 33
End: 2023-01-31

## 2023-01-31 ENCOUNTER — APPOINTMENT (OUTPATIENT)
Dept: CARDIOLOGY | Facility: CLINIC | Age: 33
End: 2023-01-31
Payer: COMMERCIAL

## 2023-01-31 VITALS
OXYGEN SATURATION: 99 % | HEART RATE: 83 BPM | BODY MASS INDEX: 36.37 KG/M2 | TEMPERATURE: 97.1 F | DIASTOLIC BLOOD PRESSURE: 84 MMHG | HEIGHT: 68 IN | WEIGHT: 240 LBS | SYSTOLIC BLOOD PRESSURE: 138 MMHG

## 2023-01-31 PROCEDURE — 93000 ELECTROCARDIOGRAM COMPLETE: CPT

## 2023-01-31 PROCEDURE — 99204 OFFICE O/P NEW MOD 45 MIN: CPT

## 2023-01-31 NOTE — REASON FOR VISIT
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  He is asymptomatic, consumes moderate alcohol, not a smoker, and is asymptomatic.

## 2023-01-31 NOTE — DISCUSSION/SUMMARY
[FreeTextEntry1] : I reassured the patient that there was no need for concern, but in about 5 years should have a coronary CTA to look for plaque or calcium.  Right now other than reducing his alcohol consumption I would not recommend any testing.

## 2023-02-01 ENCOUNTER — APPOINTMENT (OUTPATIENT)
Dept: UROLOGY | Facility: CLINIC | Age: 33
End: 2023-02-01

## 2023-02-15 ENCOUNTER — APPOINTMENT (OUTPATIENT)
Dept: FAMILY MEDICINE | Facility: CLINIC | Age: 33
End: 2023-02-15

## 2023-02-16 ENCOUNTER — APPOINTMENT (OUTPATIENT)
Dept: UROLOGY | Facility: HOSPITAL | Age: 33
End: 2023-02-16

## 2023-03-14 ENCOUNTER — RX RENEWAL (OUTPATIENT)
Age: 33
End: 2023-03-14

## 2023-03-21 ENCOUNTER — APPOINTMENT (OUTPATIENT)
Dept: PHYSICAL MEDICINE AND REHAB | Facility: CLINIC | Age: 33
End: 2023-03-21

## 2023-05-03 ENCOUNTER — APPOINTMENT (OUTPATIENT)
Dept: FAMILY MEDICINE | Facility: CLINIC | Age: 33
End: 2023-05-03

## 2023-05-15 ENCOUNTER — APPOINTMENT (OUTPATIENT)
Dept: UROLOGY | Facility: CLINIC | Age: 33
End: 2023-05-15
Payer: COMMERCIAL

## 2023-05-15 VITALS — SYSTOLIC BLOOD PRESSURE: 148 MMHG | HEART RATE: 85 BPM | DIASTOLIC BLOOD PRESSURE: 85 MMHG

## 2023-05-15 PROCEDURE — 99214 OFFICE O/P EST MOD 30 MIN: CPT

## 2023-05-15 RX ORDER — SEMAGLUTIDE 1.34 MG/ML
2 INJECTION, SOLUTION SUBCUTANEOUS
Qty: 1 | Refills: 1 | Status: DISCONTINUED | COMMUNITY
Start: 2023-01-09 | End: 2023-05-15

## 2023-05-15 RX ORDER — DULOXETINE HYDROCHLORIDE 20 MG/1
20 CAPSULE, DELAYED RELEASE PELLETS ORAL
Qty: 30 | Refills: 0 | Status: DISCONTINUED | COMMUNITY
Start: 2023-01-09 | End: 2023-05-15

## 2023-05-15 RX ORDER — OXYCODONE 5 MG/1
5 TABLET ORAL EVERY 6 HOURS
Qty: 4 | Refills: 0 | Status: DISCONTINUED | COMMUNITY
Start: 2023-01-30 | End: 2023-05-15

## 2023-05-15 NOTE — ASSESSMENT
[FreeTextEntry1] : \par plan:\par - HCTZ\par - PTH and serum calcium\par - next visit in 2 months with office ultrasound and new 24 hr urine

## 2023-05-15 NOTE — HISTORY OF PRESENT ILLNESS
[FreeTextEntry1] : 34 yo M for follow up\par see previous note\par s/p right ureteroscopy\par stent removed early due to severe symptoms\par was doing well\par \par last week started complaining of left flank pain, similar to the right renal colic he had, no fever\par KUB (brit): looking at the images, there is a small calcification that may correlate to the stone\par US (brit):\par - no hydronephrosis\par - bilateral Jets\par - left non obstructing 4 mm stone\par \par now feeling well\par 24 hr urine: very high calcium 404 (in 2022 it was 123), slightly high UUN\par discussed the images and the new stone in the left kidney\par discussed options for treatment or follow up, recommended follow up and the patient agrees\par discussed the elevated calcium in the urine and risk of stone formation, also explained the possible association with parathyroid hormone\par \par \par plan:\par - HCTZ\par - PTH and serum calcium\par - next visit in 2 months with office ultrasound and new 24 hr urine

## 2023-05-19 ENCOUNTER — APPOINTMENT (OUTPATIENT)
Dept: UROLOGY | Facility: CLINIC | Age: 33
End: 2023-05-19

## 2023-05-24 ENCOUNTER — APPOINTMENT (OUTPATIENT)
Dept: FAMILY MEDICINE | Facility: CLINIC | Age: 33
End: 2023-05-24

## 2023-05-30 ENCOUNTER — APPOINTMENT (OUTPATIENT)
Dept: PEDIATRIC MEDICAL GENETICS | Facility: CLINIC | Age: 33
End: 2023-05-30

## 2023-05-31 ENCOUNTER — APPOINTMENT (OUTPATIENT)
Dept: FAMILY MEDICINE | Facility: CLINIC | Age: 33
End: 2023-05-31
Payer: COMMERCIAL

## 2023-05-31 VITALS
DIASTOLIC BLOOD PRESSURE: 80 MMHG | SYSTOLIC BLOOD PRESSURE: 146 MMHG | HEART RATE: 84 BPM | WEIGHT: 239 LBS | BODY MASS INDEX: 36.22 KG/M2 | OXYGEN SATURATION: 98 % | HEIGHT: 68 IN

## 2023-05-31 DIAGNOSIS — A05.9 BACTERIAL FOODBORNE INTOXICATION, UNSPECIFIED: ICD-10-CM

## 2023-05-31 PROCEDURE — 69210 REMOVE IMPACTED EAR WAX UNI: CPT

## 2023-05-31 PROCEDURE — 99214 OFFICE O/P EST MOD 30 MIN: CPT | Mod: 25
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
## 2023-05-31 NOTE — PLAN
[FreeTextEntry1] : BW drawn today\par Encourage Eply Maneuvers\par Start meclizine PRN \par Start azithromycin if stomach doesn't improve in 2-3 days \par If fainting, CP, palpitations - go to ED or follow up cardiology \par If episodes continue to occur - see ENT/physical therapy for vertigo \par

## 2023-05-31 NOTE — PHYSICAL EXAM
[Normal] : normal rate, regular rhythm, normal S1 and S2 and no murmur heard [Coordination Grossly Intact] : coordination grossly intact [No Focal Deficits] : no focal deficits [Normal Gait] : normal gait [Deep Tendon Reflexes (DTR)] : deep tendon reflexes were 2+ and symmetric [Speech Grossly Normal] : speech grossly normal [Memory Grossly Normal] : memory grossly normal [Normal Affect] : the affect was normal [Normal Insight/Judgement] : insight and judgment were intact

## 2023-06-02 ENCOUNTER — APPOINTMENT (OUTPATIENT)
Age: 33
End: 2023-06-02

## 2023-06-02 LAB
ALBUMIN SERPL ELPH-MCNC: 4.4 G/DL
ALP BLD-CCNC: 78 U/L
ALT SERPL-CCNC: 89 U/L
AMYLASE/CREAT SERPL: 71 U/L
ANION GAP SERPL CALC-SCNC: 12 MMOL/L
AST SERPL-CCNC: 47 U/L
BILIRUB SERPL-MCNC: 0.2 MG/DL
BUN SERPL-MCNC: 13 MG/DL
CA-I SERPL-SCNC: 5 MG/DL
CALCIUM SERPL-MCNC: 9.5 MG/DL
CHLORIDE SERPL-SCNC: 105 MMOL/L
CO2 SERPL-SCNC: 24 MMOL/L
CREAT SERPL-MCNC: 0.86 MG/DL
EGFR: 119 ML/MIN/1.73M2
GLUCOSE SERPL-MCNC: 113 MG/DL
LPL SERPL-CCNC: 48 U/L
POTASSIUM SERPL-SCNC: 4.2 MMOL/L
PROT SERPL-MCNC: 7.3 G/DL
SODIUM SERPL-SCNC: 141 MMOL/L
TSH SERPL-ACNC: 0.57 UIU/ML

## 2023-06-07 ENCOUNTER — APPOINTMENT (OUTPATIENT)
Age: 33
End: 2023-06-07

## 2023-06-07 LAB — PTH RELATED PROT SERPL-MCNC: <2 PMOL/L

## 2023-06-12 ENCOUNTER — APPOINTMENT (OUTPATIENT)
Dept: PEDIATRIC MEDICAL GENETICS | Facility: CLINIC | Age: 33
End: 2023-06-12
Payer: COMMERCIAL

## 2023-06-12 DIAGNOSIS — Z31.440 ENCOUNTER OF MALE FOR TESTING FOR GENETIC DISEASE CARRIER STATUS FOR PROCREATIVE MANAGEMENT: ICD-10-CM

## 2023-06-12 DIAGNOSIS — Z80.9 FAMILY HISTORY OF MALIGNANT NEOPLASM, UNSPECIFIED: ICD-10-CM

## 2023-06-12 DIAGNOSIS — Z82.49 FAMILY HISTORY OF ISCHEMIC HEART DISEASE AND OTHER DISEASES OF THE CIRCULATORY SYSTEM: ICD-10-CM

## 2023-06-12 DIAGNOSIS — Z83.3 FAMILY HISTORY OF DIABETES MELLITUS: ICD-10-CM

## 2023-06-12 DIAGNOSIS — Z83.49 FAMILY HISTORY OF OTHER ENDOCRINE, NUTRITIONAL AND METABOLIC DISEASES: ICD-10-CM

## 2023-06-12 PROCEDURE — 96040: CPT | Mod: 95

## 2023-07-04 ENCOUNTER — FORM ENCOUNTER (OUTPATIENT)
Age: 33
End: 2023-07-04

## 2023-07-17 ENCOUNTER — NON-APPOINTMENT (OUTPATIENT)
Age: 33
End: 2023-07-17

## 2023-08-10 ENCOUNTER — APPOINTMENT (OUTPATIENT)
Dept: ULTRASOUND IMAGING | Facility: CLINIC | Age: 33
End: 2023-08-10

## 2023-08-10 ENCOUNTER — OUTPATIENT (OUTPATIENT)
Dept: OUTPATIENT SERVICES | Facility: HOSPITAL | Age: 33
LOS: 1 days | End: 2023-08-10

## 2023-08-10 DIAGNOSIS — N20.0 CALCULUS OF KIDNEY: ICD-10-CM

## 2023-08-15 ENCOUNTER — APPOINTMENT (OUTPATIENT)
Dept: ULTRASOUND IMAGING | Facility: CLINIC | Age: 33
End: 2023-08-15
Payer: COMMERCIAL

## 2023-08-15 ENCOUNTER — OUTPATIENT (OUTPATIENT)
Dept: OUTPATIENT SERVICES | Facility: HOSPITAL | Age: 33
LOS: 1 days | End: 2023-08-15
Payer: COMMERCIAL

## 2023-08-15 DIAGNOSIS — N20.0 CALCULUS OF KIDNEY: ICD-10-CM

## 2023-08-15 PROCEDURE — 76770 US EXAM ABDO BACK WALL COMP: CPT | Mod: 26

## 2023-08-15 PROCEDURE — 76770 US EXAM ABDO BACK WALL COMP: CPT

## 2023-08-21 ENCOUNTER — TRANSCRIPTION ENCOUNTER (OUTPATIENT)
Age: 33
End: 2023-08-21

## 2023-08-22 ENCOUNTER — TRANSCRIPTION ENCOUNTER (OUTPATIENT)
Age: 33
End: 2023-08-22

## 2023-08-25 ENCOUNTER — APPOINTMENT (OUTPATIENT)
Dept: UROLOGY | Facility: CLINIC | Age: 33
End: 2023-08-25
Payer: COMMERCIAL

## 2023-08-25 PROCEDURE — 99442: CPT

## 2023-08-25 NOTE — HISTORY OF PRESENT ILLNESS
[Home] : at home, [unfilled] , at the time of the visit. [Medical Office: ([facility])___] : at the medical office located in  [Verbal consent obtained from patient] : the patient, [unfilled] [FreeTextEntry1] : 32 yo M s/p right ureteroscopy previous 24 hr with elevated calcium prescribed HCTZ  now for follow up patient did not start the medication, was worried about lowering blood pressure could not  reviewed new 24 07/2023: low volume, still elevated calcium, PH 5.2 had a test for parathyroid related peptide reviewed blood calcium levels, normal  discussed with the patient needs better control of his hypercalciuria  discussed treatment with chlorthalidone, also recommended adding fish oil  plan: - next visit in 3 months with new 24 hr and office ultrasound - chlorthalidone - fish oil - fluid intake - PTH

## 2023-08-25 NOTE — ASSESSMENT
[FreeTextEntry1] :  plan: - next visit in 3 months with new 24 hr and office ultrasound - chlorthalidone - fish oil - fluid intake - PTH

## 2023-09-20 ENCOUNTER — TRANSCRIPTION ENCOUNTER (OUTPATIENT)
Age: 33
End: 2023-09-20

## 2023-09-21 ENCOUNTER — APPOINTMENT (OUTPATIENT)
Dept: PHYSICAL MEDICINE AND REHAB | Facility: CLINIC | Age: 33
End: 2023-09-21
Payer: COMMERCIAL

## 2023-09-21 VITALS
HEART RATE: 98 BPM | OXYGEN SATURATION: 99 % | BODY MASS INDEX: 36.22 KG/M2 | HEIGHT: 68 IN | DIASTOLIC BLOOD PRESSURE: 88 MMHG | WEIGHT: 239 LBS | SYSTOLIC BLOOD PRESSURE: 129 MMHG | RESPIRATION RATE: 14 BRPM

## 2023-09-21 DIAGNOSIS — M54.50 LOW BACK PAIN, UNSPECIFIED: ICD-10-CM

## 2023-09-21 PROCEDURE — 97810 ACUP 1/> WO ESTIM 1ST 15 MIN: CPT

## 2023-09-22 ENCOUNTER — APPOINTMENT (OUTPATIENT)
Dept: FAMILY MEDICINE | Facility: CLINIC | Age: 33
End: 2023-09-22

## 2023-09-26 DIAGNOSIS — M47.814 SPONDYLOSIS W/OUT MYELOPATHY OR RADICULOPATHY, THORACIC REGION: ICD-10-CM

## 2023-09-28 ENCOUNTER — APPOINTMENT (OUTPATIENT)
Dept: CARDIOLOGY | Facility: CLINIC | Age: 33
End: 2023-09-28

## 2023-10-19 ENCOUNTER — APPOINTMENT (OUTPATIENT)
Dept: ORTHOPEDIC SURGERY | Facility: CLINIC | Age: 33
End: 2023-10-19
Payer: COMMERCIAL

## 2023-10-19 DIAGNOSIS — M79.18 MYALGIA, OTHER SITE: ICD-10-CM

## 2023-10-19 PROCEDURE — 99214 OFFICE O/P EST MOD 30 MIN: CPT

## 2023-10-20 ENCOUNTER — APPOINTMENT (OUTPATIENT)
Dept: PHYSICAL MEDICINE AND REHAB | Facility: CLINIC | Age: 33
End: 2023-10-20
Payer: COMMERCIAL

## 2023-10-20 VITALS
WEIGHT: 253 LBS | HEIGHT: 68 IN | SYSTOLIC BLOOD PRESSURE: 141 MMHG | BODY MASS INDEX: 38.34 KG/M2 | HEART RATE: 87 BPM | DIASTOLIC BLOOD PRESSURE: 84 MMHG

## 2023-10-20 PROCEDURE — 99204 OFFICE O/P NEW MOD 45 MIN: CPT

## 2023-12-08 ENCOUNTER — APPOINTMENT (OUTPATIENT)
Dept: UROLOGY | Facility: CLINIC | Age: 33
End: 2023-12-08
Payer: COMMERCIAL

## 2023-12-08 DIAGNOSIS — N20.0 CALCULUS OF KIDNEY: ICD-10-CM

## 2023-12-08 DIAGNOSIS — E83.50 UNSPECIFIED DISORDER OF CALCIUM METABOLISM: ICD-10-CM

## 2023-12-08 PROCEDURE — 99214 OFFICE O/P EST MOD 30 MIN: CPT

## 2023-12-08 PROCEDURE — 76775 US EXAM ABDO BACK WALL LIM: CPT

## 2023-12-08 RX ORDER — HYDROCHLOROTHIAZIDE 25 MG/1
25 TABLET ORAL
Qty: 90 | Refills: 3 | Status: DISCONTINUED | COMMUNITY
Start: 2023-05-15 | End: 2023-12-08

## 2023-12-13 PROBLEM — N20.0 NEPHROLITHIASIS: Status: ACTIVE | Noted: 2022-05-24

## 2023-12-13 PROBLEM — E83.50 CALCIUM METABOLISM DISORDER: Status: ACTIVE | Noted: 2023-05-15

## 2023-12-13 NOTE — HISTORY OF PRESENT ILLNESS
[FreeTextEntry1] : 34 yo M for follow up with nephrolithiasis s/p right ureteroscopy hypercalciuria started on HCTZ that was then changed to chlorthalidone and fish oil  here for follow up and feeling well was not taking any medication reviewed new 24 hr: good volume, still elevated calcium, Na high this time us today: no stones, no hydro PTH was not tested   had a long discussion about medical treatment and hypercalciuria also emphasized the importance of fluid intake, as this is what is protecting him and diluting the urine discussed the risk of stone formation, but for now ultrasound is clean  plan: - will start chlorthalidone now - continue with fish oil - PTH -  follow up in 2 months with new 24 hr

## 2023-12-13 NOTE — ASSESSMENT
[FreeTextEntry1] :  plan: - will start chlorthalidone now - continue with fish oil - PTH -  follow up in 2 months with new 24 hr

## 2024-01-12 ENCOUNTER — APPOINTMENT (OUTPATIENT)
Dept: HUMAN REPRODUCTION | Facility: CLINIC | Age: 34
End: 2024-01-12

## 2024-01-18 ENCOUNTER — APPOINTMENT (OUTPATIENT)
Dept: HUMAN REPRODUCTION | Facility: CLINIC | Age: 34
End: 2024-01-18
Payer: COMMERCIAL

## 2024-01-18 PROCEDURE — 89322 SEMEN ANAL STRICT CRITERIA: CPT

## 2024-02-01 ENCOUNTER — APPOINTMENT (OUTPATIENT)
Dept: FAMILY MEDICINE | Facility: CLINIC | Age: 34
End: 2024-02-01
Payer: COMMERCIAL

## 2024-02-01 ENCOUNTER — NON-APPOINTMENT (OUTPATIENT)
Age: 34
End: 2024-02-01

## 2024-02-01 VITALS — DIASTOLIC BLOOD PRESSURE: 82 MMHG | SYSTOLIC BLOOD PRESSURE: 130 MMHG

## 2024-02-01 VITALS
HEIGHT: 68 IN | WEIGHT: 254 LBS | SYSTOLIC BLOOD PRESSURE: 130 MMHG | DIASTOLIC BLOOD PRESSURE: 80 MMHG | HEART RATE: 98 BPM | BODY MASS INDEX: 38.49 KG/M2 | OXYGEN SATURATION: 98 %

## 2024-02-01 DIAGNOSIS — Z13.1 ENCOUNTER FOR SCREENING FOR DIABETES MELLITUS: ICD-10-CM

## 2024-02-01 DIAGNOSIS — Z13.220 ENCOUNTER FOR SCREENING FOR LIPOID DISORDERS: ICD-10-CM

## 2024-02-01 DIAGNOSIS — Z00.00 ENCOUNTER FOR GENERAL ADULT MEDICAL EXAMINATION W/OUT ABNORMAL FINDINGS: ICD-10-CM

## 2024-02-01 DIAGNOSIS — Z13.6 ENCOUNTER FOR SCREENING FOR CARDIOVASCULAR DISORDERS: ICD-10-CM

## 2024-02-01 DIAGNOSIS — M54.6 PAIN IN THORACIC SPINE: ICD-10-CM

## 2024-02-01 DIAGNOSIS — G43.909 MIGRAINE, UNSPECIFIED, NOT INTRACTABLE, W/OUT STATUS MIGRAINOSUS: ICD-10-CM

## 2024-02-01 DIAGNOSIS — E55.9 VITAMIN D DEFICIENCY, UNSPECIFIED: ICD-10-CM

## 2024-02-01 DIAGNOSIS — Z13.29 ENCOUNTER FOR SCREENING FOR OTHER SUSPECTED ENDOCRINE DISORDER: ICD-10-CM

## 2024-02-01 PROCEDURE — 99395 PREV VISIT EST AGE 18-39: CPT

## 2024-02-01 PROCEDURE — G0447 BEHAVIOR COUNSEL OBESITY 15M: CPT

## 2024-02-02 LAB
25(OH)D3 SERPL-MCNC: 28.3 NG/ML
ALBUMIN SERPL ELPH-MCNC: 4.7 G/DL
ALP BLD-CCNC: 87 U/L
ALT SERPL-CCNC: 71 U/L
ANION GAP SERPL CALC-SCNC: 17 MMOL/L
APPEARANCE: CLEAR
AST SERPL-CCNC: 41 U/L
BACTERIA: NEGATIVE /HPF
BASOPHILS # BLD AUTO: 0.02 K/UL
BASOPHILS NFR BLD AUTO: 0.4 %
BILIRUB SERPL-MCNC: 0.4 MG/DL
BILIRUBIN URINE: NEGATIVE
BLOOD URINE: NEGATIVE
BUN SERPL-MCNC: 13 MG/DL
CALCIUM SERPL-MCNC: 9.6 MG/DL
CAST: 1 /LPF
CHLORIDE SERPL-SCNC: 104 MMOL/L
CHOLEST SERPL-MCNC: 171 MG/DL
CO2 SERPL-SCNC: 22 MMOL/L
COLOR: YELLOW
CREAT SERPL-MCNC: 0.97 MG/DL
EGFR: 106 ML/MIN/1.73M2
EOSINOPHIL # BLD AUTO: 0.12 K/UL
EOSINOPHIL NFR BLD AUTO: 2.2 %
EPITHELIAL CELLS: 0 /HPF
ESTIMATED AVERAGE GLUCOSE: 117 MG/DL
GLUCOSE QUALITATIVE U: NEGATIVE MG/DL
GLUCOSE SERPL-MCNC: 116 MG/DL
HBA1C MFR BLD HPLC: 5.7 %
HCT VFR BLD CALC: 47.2 %
HDLC SERPL-MCNC: 41 MG/DL
HGB BLD-MCNC: 15.7 G/DL
IMM GRANULOCYTES NFR BLD AUTO: 0.4 %
KETONES URINE: NEGATIVE MG/DL
LDLC SERPL CALC-MCNC: 107 MG/DL
LEUKOCYTE ESTERASE URINE: NEGATIVE
LYMPHOCYTES # BLD AUTO: 1.48 K/UL
LYMPHOCYTES NFR BLD AUTO: 27.7 %
MAN DIFF?: NORMAL
MCHC RBC-ENTMCNC: 28 PG
MCHC RBC-ENTMCNC: 33.3 GM/DL
MCV RBC AUTO: 84.3 FL
MICROSCOPIC-UA: NORMAL
MONOCYTES # BLD AUTO: 0.46 K/UL
MONOCYTES NFR BLD AUTO: 8.6 %
NEUTROPHILS # BLD AUTO: 3.24 K/UL
NEUTROPHILS NFR BLD AUTO: 60.7 %
NITRITE URINE: NEGATIVE
NONHDLC SERPL-MCNC: 130 MG/DL
PH URINE: 6
PLATELET # BLD AUTO: 281 K/UL
POTASSIUM SERPL-SCNC: 4.4 MMOL/L
PROT SERPL-MCNC: 7.7 G/DL
PROTEIN URINE: NEGATIVE MG/DL
RBC # BLD: 5.6 M/UL
RBC # FLD: 13.6 %
RED BLOOD CELLS URINE: 0 /HPF
SODIUM SERPL-SCNC: 143 MMOL/L
SPECIFIC GRAVITY URINE: 1.02
TRIGL SERPL-MCNC: 124 MG/DL
TSH SERPL-ACNC: 1.02 UIU/ML
URATE SERPL-MCNC: 6.1 MG/DL
UROBILINOGEN URINE: 0.2 MG/DL
WBC # FLD AUTO: 5.34 K/UL
WHITE BLOOD CELLS URINE: 1 /HPF

## 2024-02-02 NOTE — PLAN
[FreeTextEntry1] : Bloodwork done. Recommend the patient increase physical activity levels.  Recommend the patient get adequate rest to treat fatigue and headaches. Order for an MRI of the patients thoracic spine to examine herniated discs / upper back pain.  Will follow-up with the patient in 1 year or as needed.

## 2024-02-02 NOTE — HEALTH RISK ASSESSMENT
[Good] : ~his/her~  mood as  good [Yes] : Yes [2 - 4 times a month (2 pts)] : 2-4 times a month (2 points) [3 or 4 (1 pt)] : 3 or 4  (1 point) [Less than monthly (1 pt)] : Less than monthly (1 point) [No] : In the past 12 months have you used drugs other than those required for medical reasons? No [No falls in past year] : Patient reported no falls in the past year [0] : 2) Feeling down, depressed, or hopeless: Not at all (0) [PHQ-2 Negative - No further assessment needed] : PHQ-2 Negative - No further assessment needed [None] : None [With Family] : lives with family [Employed] : employed [] :  [Feels Safe at Home] : Feels safe at home [Fully functional (bathing, dressing, toileting, transferring, walking, feeding)] : Fully functional (bathing, dressing, toileting, transferring, walking, feeding) [Fully functional (using the telephone, shopping, preparing meals, housekeeping, doing laundry, using] : Fully functional and needs no help or supervision to perform IADLs (using the telephone, shopping, preparing meals, housekeeping, doing laundry, using transportation, managing medications and managing finances) [Smoke Detector] : smoke detector [Never] : Never [Audit-CScore] : 4 [de-identified] : Patient does not exercise regularly. Pt does enjoy golfing.  [de-identified] : Patient is not following a specific diet plan. Pt is staying properly hydrated. [[id number]] : 0 [Change in mental status noted] : No change in mental status noted [Reports changes in hearing] : Reports no changes in hearing [Reports changes in vision] : Reports no changes in vision [Reports normal functional visual acuity (ie: able to read med bottle)] : Reports poor functional visual acuity.  [Reports changes in dental health] : Reports no changes in dental health

## 2024-02-02 NOTE — COUNSELING
[Behavioral health counseling provided] : Behavioral health counseling provided [Sleep ___ hours/day] : Sleep [unfilled] hours/day [Engage in a relaxing activity] : Engage in a relaxing activity [Benefits of weight loss discussed] : Benefits of weight loss discussed [Encouraged to maintain food diary] : Encouraged to maintain food diary [Encouraged to increase physical activity] : Encouraged to increase physical activity [FreeTextEntry4] : 15

## 2024-02-02 NOTE — HISTORY OF PRESENT ILLNESS
[FreeTextEntry1] : CPE [de-identified] :  A 34-year-old male presents today for a CPE. Mood is good. Pt is currently taking Alprazolam 0.25mg and Chlorthalidone 25mg. Pt has a strong family history of heart disease, had followed up with the cardiologist Dr. Nuñez in 2023, was told to follow-up in 5 years for a CT calcium score. Pt has gained significant weight recently. Pt has cut back on drinking. Pt c/o right upper back pain. Pt had followed up with Dr. Reddy for treatment in October. Pt had tried trigger point injection which had been effective in treating pain. Pt had an MRI done which showed a herniated discs in his thoracic spine. Pt had a lithotripsy done for treatment of kidney stone which had caused him immense pain, pt had been following up with the urologist, Dr. Benjamin, for monitoring. Pt c/o recent reoccurring headaches, denies any nasal congestion or sore throats. Pt has been taking Advil for treatment. Pt states physical exertion exacerbates this, located above his left eye. Denies visual disturbances or other symptoms. Pt is under significant stress with work. Pt does snore, has not been tested for sleep apnea. Pt denies any changes in bowel habits.

## 2024-02-02 NOTE — ADDENDUM
[FreeTextEntry1] : This note was written by Claudia Durbin, acting as the  for Dr. Casper. This note accurately reflects the work and decisions made by Dr. Casper.

## 2024-02-09 ENCOUNTER — APPOINTMENT (OUTPATIENT)
Dept: UROLOGY | Facility: CLINIC | Age: 34
End: 2024-02-09

## 2024-02-16 ENCOUNTER — APPOINTMENT (OUTPATIENT)
Dept: MRI IMAGING | Facility: CLINIC | Age: 34
End: 2024-02-16

## 2024-02-21 DIAGNOSIS — M54.16 RADICULOPATHY, LUMBAR REGION: ICD-10-CM

## 2024-02-29 ENCOUNTER — APPOINTMENT (OUTPATIENT)
Dept: PHYSICAL MEDICINE AND REHAB | Facility: CLINIC | Age: 34
End: 2024-02-29
Payer: COMMERCIAL

## 2024-02-29 VITALS
HEIGHT: 68 IN | WEIGHT: 250 LBS | HEART RATE: 80 BPM | DIASTOLIC BLOOD PRESSURE: 88 MMHG | BODY MASS INDEX: 37.89 KG/M2 | RESPIRATION RATE: 16 BRPM | SYSTOLIC BLOOD PRESSURE: 139 MMHG

## 2024-02-29 PROCEDURE — 99214 OFFICE O/P EST MOD 30 MIN: CPT

## 2024-02-29 NOTE — HISTORY OF PRESENT ILLNESS
[FreeTextEntry1] : 34 y.o. M w/ h/o right lateral lumbar back/flank pain of unclear etiology returns to office for follow up.  Pt. last seen by me Sept. 2022.  In the interim, pt. had been seeing Dr. Ram for acupuncture and OMT which was not very effective.  Pt. consulted Dr. Price who referred to Dr. Reddy who recommended R T9 & T10 intercostal nerve blocks which patient deferred.  Pt. states that his painful sxs are positional in nature worse with prolonged sitting.  Feels like a squeezing sensation around the right flank.  Endorses N/T/B across posterior shoulder (not superior medial border scapula) when sitting at desk.  New MRI T-spine not authorized by insurance.  Only takes OTC ibuprofen.  Tried capsacian cream which did not help.  Did not want to try gabapentin.

## 2024-02-29 NOTE — PHYSICAL EXAM
[FreeTextEntry1] : NAD A&Ox3 Obese ROM T-spine: 45' LR to either side; 30' side bend to either side ROM L-spine: near full forward flexion w/o pain; 25-30' passive extension w/o pain (static) ROM Hips: smooth IR/ER w/o pain Pelvic tilt: no tilt Seated slump test: neg right SLR: neg b/l ALESSIO's: neg b/l FADIR's: neg b/l DTR's: 1+ knees; 2+ ankles MMT: 5/5 b/l LE Sensation: SILT Toe & Heel Walk: Yes Palpation: right QL muscle TTP

## 2024-02-29 NOTE — ASSESSMENT
[FreeTextEntry1] : 34 y.o. M w/ c/o chronic right lateral mid thoracic back/flank pain of unclear etiology.  I spent most of today's office visit (30 min) reviewing the patient's MRI T-spine, discussing potential etiologies, pathogenesis, further diagnostic work-up and non-operative management.  MRI thoracic spine significant for T6-7, 7-8, 8-9 central HNPs w/o associated central canal stenosis.  Of note, pt. has prominent epidural lipomatosis w/ associated Schmorl's nodes at T7, T10 & T11.  DDx of patient's right mid thoracic back and flank pain include referred pain from DDD/HNP vs. IC neuralgia.  MRI L-spine previously reviewed w/o significant HNP, central canal or NF narrowing on patient's symptomatic right side.  Discussed clinical utility of bariatric surgical consultation to see if weight loss would help resorb the epidural lipomatosis deposits around his thoracic cord and ? lead to sx relief.  Pt. is clear that he does not want to try IC nerve blocks but they may be both diagnostic and therapeutic.  Pt. is in agreement with plan.  All questions answered.  RTC prn.

## 2024-02-29 NOTE — DATA REVIEWED
[MRI] : MRI [FreeTextEntry1] : MRI L-spine (March 2022): Nonspecific straightening. No significant spinal canal or neural foraminal stenosis. Mild lower lumbar facet arthrosis.  T10-11 non-acute appearing Schmorl's noted.  MRI T-spine (2022): Mild thoracic levoscoliosis.  Epidural lipomatosis.  T6-T7: Central protruding-type disc herniation impinging upon the ventral thecal sac. There is no spinal canal stenosis or neural foraminal narrowing.  T7-T8: Central protruding-type disc herniation impinging upon the ventral thecal sac. There is no spinal canal stenosis or neural foraminal narrowing.  T8-T9: Central protruding-type disc herniation with a posterior annular fissure impinging upon the ventral thecal sac. There is no spinal canal stenosis or neural foraminal narrowing.  There are Schmorl's nodes involving the inferior endplates of T7 and T10 and superior endplate of T11. There are hemangioma within the T3 and T9 vertebral bodies.  X-rays T-spine (2022): 12 rib pairs present.  No compression fractures or spondylolistheses.  T9-T11 Schmorl's node endplate changes apparent on CT images not well demonstrated radiographically. Otherwise preserved intervertebral disc spaces.  Posterior facet alignment maintained.  No discrete lytic or blastic lesions.  Clear visualized lungs and midline normal caliber trachea.

## 2024-03-07 ENCOUNTER — APPOINTMENT (OUTPATIENT)
Dept: ORTHOPEDIC SURGERY | Facility: CLINIC | Age: 34
End: 2024-03-07
Payer: COMMERCIAL

## 2024-03-07 VITALS — BODY MASS INDEX: 37.89 KG/M2 | HEIGHT: 68 IN | WEIGHT: 250 LBS

## 2024-03-07 DIAGNOSIS — M54.6 PAIN IN THORACIC SPINE: ICD-10-CM

## 2024-03-07 DIAGNOSIS — M79.2 NEURALGIA AND NEURITIS, UNSPECIFIED: ICD-10-CM

## 2024-03-07 DIAGNOSIS — G58.8 OTHER SPECIFIED MONONEUROPATHIES: ICD-10-CM

## 2024-03-07 PROCEDURE — 72070 X-RAY EXAM THORAC SPINE 2VWS: CPT

## 2024-03-07 PROCEDURE — 99213 OFFICE O/P EST LOW 20 MIN: CPT

## 2024-03-07 NOTE — DISCUSSION/SUMMARY
[Medication Risks Reviewed] : Medication risks reviewed [de-identified] : 34 year old male presents with symptoms suggestive of intercostal muscular versus neurogenic pain. 25 minutes was spent reviewing the x-rays as well as discussing with the patient their clinical presentation, diagnosis and providing education. Conservative treatment was discussed with the patient at length. Anticipatory guidance regarding disease process, avoidance of acute exacerbation this was discussed at length and all patients commenting concerns were answered to the patient's satisfaction.  At this time based upon benign imaging would not recommend any type of large-scale thoracic procedure from a surgical standpoint.  I think there is a good chance that injection therapy whether that be an epidural injection versus a intercostal nerve block can help in providing some type of relief and/or answers with regards to pathology for symptoms.  The patient is recommended to follow-up with Dr. Reddy or Dr. Garcia to keep this discussion going about injection therapy.  Continue with activities as tolerated.  Encouraged continued activity to help with weight control and overall wellbeing.  He may follow-up after injections as needed or sooner if needed to continue discussing alternative options.  All questions and concerns were addressed with the patient and they are in agreement with this plan.  This note was generated using dragon medical dictation software. A reasonable effort has been made for proofreading its contents, but typos may still remain. If there are any questions or points of clarification needed please notify my office.

## 2024-03-07 NOTE — PHYSICAL EXAM
[de-identified] : CONSTITUTIONAL: The patient is a very pleasant individual who is well-nourished and who appears stated age. PSYCHIATRIC: The patient is alert and oriented X 3 and in no apparent distress, and participates with orthopedic evaluation well. HEAD: Atraumatic and is nonsyndromic in appearance. EENT: No visible thyromegaly, EOMI. RESPIRATORY: Respiratory rate is regular, not dyspneic on examination. LYMPHATICS: There is no inguinal lymphadenopathy INTEGUMENTARY: Skin is clean, dry, and intact about the bilateral lower extremities and lumbar spine. VASCULAR: There is brisk capillary refill about the bilateral lower extremities. NEUROLOGIC: There are no pathologic reflexes. There is no decrease in sensation of the bilateral lower extremities on Wartenberg pinwheel examination. Deep tendon reflexes are well maintained at 2+/4 of the bilateral lower extremities and are symmetric. MUSCULOSKELETAL: There is no visible muscular atrophy. Manual motor strength is well maintained in the bilateral lower extremities. Range of motion of lumbar spine is well maintained. The patient ambulates in a non-myelopathic manner. Negative tension sign and straight leg raise bilaterally. Quad extension, ankle dorsiflexion, EHL, plantar flexion, and ankle eversion are well preserved. Normal secondary orthopaedic exam of bilateral hips, greater trochanteric area, knees and ankles.  Patient pinpoints pain to anterolateral mid to lower rib cage.  [de-identified] : X-ray 2 views of thoracic spine were obtained in the office today 3/7/2024 demonstrating no acute osseous abnormalities.  MRI of the thoracic spine obtained on 10/19/2022 from [facility] radiology and again reviewed in the office today demonstrated no areas of critical stenosis, no discogenic pathology.

## 2024-03-07 NOTE — HISTORY OF PRESENT ILLNESS
[de-identified] : 34-year-old male presents today for evaluation of right-sided intercostal pain.  The patient was last seen for this in October 2023 at which point we discussed surgical versus injection therapy.  The patient was then referred to Dr. Reddy to discuss possible injection therapy.  He states with Dr. Reddy she recommended an epidural injection which he was not willing to undergo.  He then saw Dr. Garcia to discuss an intercostal block, but did not ultimately go through with this as he wanted to discuss with us our suspected pathology for his symptoms.  He states his symptoms are between the mid to lower ribs on the anterior lateral right side of the chest.  His pain is present at all times and is not made worse by breathing or palpation.  He states his pain is relieved when laying flat and as such she has been having trouble finding energy/will to do activities.  He has trouble getting up from a laying position secondary to his symptoms.  He also reports pain is relieved when walking but again he has difficulty with transitioning positions and so has not been walking as much.  He has not been taking any medications for this such as anti-inflammatories or neurogenic medication such as gabapentin.  He returns today to further discuss symptoms and possible treatment options.

## 2024-03-07 NOTE — ED ADULT TRIAGE NOTE - BP NONINVASIVE SYSTOLIC (MM HG)
[FreeTextEntry1] : Patient is a 64 yo F who presents for breast cancer surveillance and abnormal imaging. Hx of LEFT needle loc. lumpx and SNB in 10/2020 (age 61) yielding 1.7cm IDC (ER+, HER2-), DCIS, negative margins, 0/1LN. S/p RT. Currently on Anastrozole (Former Dr. Mcnamara, now Dr. Roberts). Hx of LEFT excisional bx in 2006 (age 47) for ADH (Dr. Donovan, no path on file). Patient has a family history of breast cancer in mother (age 44). Patient is BRCA/9 gene panel negative (tested 2020). Patient denies any palpable masses, skin changes, or nipple discharge. Of note, patient with recent brother in law death in family.  TNM Staging: pT1c, pN0, pM0 AJCC Staging: IA  9/7/17: B/l MG- dense, L - stable superior post-op distortion. BI-RADS 2. 9/5/19: B/l MG- stable. BI-RADS 2. 9/9/20: B/l MG & US- dense L - questioned lower central persistent architectural distortion; US - L - 1.8cm irregular hypoechoic mass 5:00 5FN c/w distortion on MG; R - 0.4cm cyst 9:00 7FN. BI-RADS 4. 9/14/20: L US bx 5:00 5FN: ribbon shaped clip - 0.8cm well-differentiated IDC ER+ (90%), PA+ (90%), HER2 1+ (-) 9/22/20: MRI- L 1 .6 area enhancement corresponds to known cancer. Additional 3.5 CM linear enhancement-biopsy recommend 9/24/20: L MR bx x2- SITE 1) L anterior NME- nonprolif breast changes, cylinder clip, concordant. SITE 2) L posterior NME-  nonprolif breast changes, dumbbell clip, concordant  10/22/20: L nloc lumpx & SLNBx path- 1.7 cm well differentiated invasive ductal carcinoma (ER/PA >90%, HER2-), intermediate DCIS, negative margins, LN 0/1 9/8/21: B/L MG & US- Dense. L postsurgical changes. No adenopathy. BIRADS 2. 4/25/22: MRI- no MR evidence of malignancy 9/9/22: B/l MG & US- heterogeneously dense. L stable postsurgical changes RAJEEV. BI-RADS 2 3/6/23: B/l MRI- heterogeneously dense, L stable postsurgical and RT changes. RAJEEV. BIRADS 2.  8/17/23: B/l MG & US- heterogeneously dense. L stable surgical changes. R 0.2cm cyst 9:00 4FN. R stable cyst vs mass 9:00. BI-RADS 2 3/7/24: MRI- heterogeneously dense, L new area of irregular enhancement w/ likely associated 2.8 cm suspicious distortion in area of prior lumpx (does NOT extend to skin or chest wall) (rec L MR bx), R-RAJEEV. B/l nipples and skin unremarkable, b/l no significant axillary or internal mammary lymphadenopathy. BIRADS 4.  154

## 2024-03-28 ENCOUNTER — APPOINTMENT (OUTPATIENT)
Dept: HUMAN REPRODUCTION | Facility: CLINIC | Age: 34
End: 2024-03-28
Payer: COMMERCIAL

## 2024-03-28 PROCEDURE — ZZZZZ: CPT

## 2024-04-10 ENCOUNTER — RESULT CHARGE (OUTPATIENT)
Age: 34
End: 2024-04-10

## 2024-04-11 ENCOUNTER — APPOINTMENT (OUTPATIENT)
Dept: CARDIOLOGY | Facility: CLINIC | Age: 34
End: 2024-04-11
Payer: COMMERCIAL

## 2024-04-11 VITALS
DIASTOLIC BLOOD PRESSURE: 64 MMHG | BODY MASS INDEX: 37.71 KG/M2 | WEIGHT: 248 LBS | HEART RATE: 78 BPM | OXYGEN SATURATION: 94 % | SYSTOLIC BLOOD PRESSURE: 128 MMHG

## 2024-04-11 PROCEDURE — 99214 OFFICE O/P EST MOD 30 MIN: CPT

## 2024-04-11 PROCEDURE — 93000 ELECTROCARDIOGRAM COMPLETE: CPT

## 2024-04-11 RX ORDER — AZITHROMYCIN 500 MG/1
500 TABLET, FILM COATED ORAL DAILY
Qty: 1 | Refills: 0 | Status: DISCONTINUED | COMMUNITY
Start: 2023-05-31 | End: 2024-04-11

## 2024-04-11 RX ORDER — MECLIZINE HYDROCHLORIDE 25 MG/1
25 TABLET ORAL 3 TIMES DAILY
Qty: 30 | Refills: 1 | Status: DISCONTINUED | COMMUNITY
Start: 2023-05-31 | End: 2024-04-11

## 2024-04-11 RX ORDER — CHLORTHALIDONE 25 MG/1
25 TABLET ORAL DAILY
Qty: 90 | Refills: 3 | Status: DISCONTINUED | COMMUNITY
Start: 2023-08-25 | End: 2024-04-11

## 2024-04-11 RX ORDER — CAPSAICIN 0.1 G/100G
0.1 CREAM TOPICAL 3 TIMES DAILY
Qty: 3 | Refills: 1 | Status: DISCONTINUED | COMMUNITY
Start: 2023-10-20 | End: 2024-04-11

## 2024-04-11 RX ORDER — ALPRAZOLAM 0.25 MG/1
0.25 TABLET ORAL
Qty: 30 | Refills: 0 | Status: DISCONTINUED | COMMUNITY
Start: 2023-01-16 | End: 2024-04-11

## 2024-04-11 NOTE — REVIEW OF SYSTEMS
Physical Therapy Daily Treatment     Visit Count: 2  Plan of Care Dates: Initial: 6/25/2018 Through: 8/20/2018  Insurance Information: physical and speech therapy combined cap of $2010 per calendar year   Patient has had 18 previous OT visits   Next Referring Provider Visit: 8/14/18     Referred by: Henrique Herrera MD  Medical Diagnosis (from order): M75.122 Complete tear of left rotator cuff   Treatment Diagnosis: Shoulder Symptoms with Pain, Impaired Range of Motion and Impaired Motor Function/Muscle Performance  Insurance: 1. MEDICARE  2.      Date of Surgery: 2/20/18; Surgery performed: Open rotator cuff repair. Arthroscopic labral debridement. Arthroscopic subacromial decompression.  Open distal clavicle excision. Open biceps tenodesis; Physician Guidelines: yes  Diagnosis Precautions: none  Chart reviewed: Relevant co-morbidities, allergies, tests and medications: none     SUBJECTIVE   Shoulder just aches, but it is not that bad. Exercises are going good. Did not have any aches at all yesterday. Range of motion seems to be about the same. Has a hard time holding something, if he has to extend his arm, like working on a car. Going to try to play softball this weekend. He pitches, but it is underhand.      Current Pain: 0/10.    Functional Change: none     OBJECTIVE   Range of Motion (degrees)    Norm Left Right Left    Shoulder                    Date   Initial Initial 6/27/18   Flexion 170-180 78 155 92   Extension 50-60 42 63 55   Abduction 170-180 55 140 75   Adduction 50-75        Internal Rotation   Small of low back T8 T10   External  Rotation 80-90 38 78 40   standard testing positions unless otherwise noted; Key: ranges are reported in active range of motion unless noted as AA=active assistive or P=passive range of motion, * denotes pain   Comments: Only those motions that were assessed are noted.    Treatment   Therapeutic Exercise:   Pulley for 5 minutes  Upper body ergometer for 6 minutes  (3 minutes forward and 3 minutes backward)    Supine flexion with contralateral assist 5x10 seconds   Supine External rotation with cane 5x10 seconds   Standing Internal rotation behind the back with hands clasped 5x10 seconds   Seated table slides for abduction 5x10 seconds (also added flexion and External rotation)  Sleeper stretches 10x5 seconds   Sun tan External rotation stretch 2x30 seconds     Supine protraction x15  Supine rhythmic stabilization 2x30 seconds   Prone extension, row and horizontal abduction x15 each     Current Home Program (not performed this date except as noted above): Supine flexion with contralateral assist, Supine External rotation with cane, Standing Internal rotation behind the back with hands clasped, Seated table slides for abduction, Pendulum    ASSESSMENT   Reviewed home exercise program, which patient was able to demonstrate with proper form without cues. Added warm up on pulley and Upper body ergometer for range of motion and endurance. Good improvements in range of motion measured for all motions. Added new stretches in different or various positions for further increasing shoulder range of motion for all motions. Added new exercises for scapular stabilization. Tolerating sessions well. Some soreness and achiness at end of treatment. Progressed home exercise program.     Pain after treatment: 1-2/10  Result of above outlined education: Verbalizes understanding and Demonstrates understanding    Goals:       To be obtained by end of this plan of care:  1. Patient independent with modified and progressed home exercise program.  2. Patient will decrease involved shoulder pain/symptoms to 0-2/10  to aid in normalization of upper extremity movements to aid activities of independent daily living, returning to community activities, age appropriate activities.   3. Patient will increase involved shoulder active range of motion to Within functional limits to aid in tolerating repetitive  overhead/upper extremity activity, reaching behind back for independent living tasks, completion of self-care tasks/dressing.   4. Patient will increase involved shoulder strength to within functional limits to aid in lifting and carrying tasks during performance of household chores and daily tasks.  5. Patient will be able to sleep 6 hours without disruption from pain.     PLAN   Continue with therapy per initial Plan of Care. Progress as tolerated per pt and pt's symptoms.     THERAPY DAILY BILLING   Primary Insurance:  MEDICARE  Secondary Insurance:     Evaluation Procedures:  No evaluation codes were used on this date of service    Timed Procedures:  Therapeutic Exercise, 41 minutes    Untimed Procedures:  No untimed codes were used on this date of service    Total Treatment Time: 41 minutes    G-Code:  G-Code Score ABN form  : Current Self Cares Limitation,  CJ - 20% to 39% impaired, limited or restricted  : Goal Self Cares Limitation,  CI - 1% to 19% impaired, limited or restricted  Modifier based on outcome measure(s)/functional testing/clinical judgement as listed above    The referring provider's electronic or written signature on the evaluation authorizes the therapy plan of care and certifies the need for these services, furnished under this plan of care while under their care.  Referring provider signature on file      [Negative] : Heme/Lymph

## 2024-04-11 NOTE — HISTORY OF PRESENT ILLNESS
[FreeTextEntry1] : 35 yo male with no PMH. + Family hisptry Father had a MI at age of 40. For the past 2 weeks he has noted episodes of dizziness, worsening with positional changes. He had an episode when he became profoundly dizzy HR 140s pale and diaphoretic. No chest pain Pt has lost 15 lbs recently and decreased ETOH consumption BP lying 122/80 HR 82 BP siting 124/76 HR 84.

## 2024-04-11 NOTE — DISCUSSION/SUMMARY
[EKG obtained to assist in diagnosis and management of assessed problem(s)] : EKG obtained to assist in diagnosis and management of assessed problem(s) [FreeTextEntry1] : EMELINA GALAVIZ is a 34 year old M who presents today Apr 11, 2024 with the above history and the following active issues: Presyncope: 7 day event monitor placed and EST ordered. Pt instructed to hydrate and monitor food intake to assess if episodes are hypoglycemic.

## 2024-04-12 ENCOUNTER — APPOINTMENT (OUTPATIENT)
Dept: FAMILY MEDICINE | Facility: CLINIC | Age: 34
End: 2024-04-12

## 2024-04-25 ENCOUNTER — APPOINTMENT (OUTPATIENT)
Dept: CARDIOLOGY | Facility: CLINIC | Age: 34
End: 2024-04-25

## 2024-05-14 ENCOUNTER — APPOINTMENT (OUTPATIENT)
Dept: CARDIOLOGY | Facility: CLINIC | Age: 34
End: 2024-05-14
Payer: COMMERCIAL

## 2024-05-14 DIAGNOSIS — R55 SYNCOPE AND COLLAPSE: ICD-10-CM

## 2024-05-14 DIAGNOSIS — Z82.49 FAMILY HISTORY OF ISCHEMIC HEART DISEASE AND OTHER DISEASES OF THE CIRCULATORY SYSTEM: ICD-10-CM

## 2024-05-14 DIAGNOSIS — R42 DIZZINESS AND GIDDINESS: ICD-10-CM

## 2024-05-14 PROCEDURE — 93015 CV STRESS TEST SUPVJ I&R: CPT

## 2024-05-16 ENCOUNTER — NON-APPOINTMENT (OUTPATIENT)
Age: 34
End: 2024-05-16

## 2024-05-17 ENCOUNTER — APPOINTMENT (OUTPATIENT)
Dept: CARDIOLOGY | Facility: CLINIC | Age: 34
End: 2024-05-17
Payer: COMMERCIAL

## 2024-05-17 VITALS
OXYGEN SATURATION: 98 % | HEART RATE: 73 BPM | HEIGHT: 68 IN | SYSTOLIC BLOOD PRESSURE: 130 MMHG | DIASTOLIC BLOOD PRESSURE: 64 MMHG | WEIGHT: 247 LBS | BODY MASS INDEX: 37.44 KG/M2

## 2024-05-17 PROCEDURE — 99213 OFFICE O/P EST LOW 20 MIN: CPT

## 2024-05-17 NOTE — ADDENDUM
[FreeTextEntry1] : I was directly involved in the management plan and recommendations of care provided to the patient.  I personally reviewed the history and physical exam and plan as documented by the NP above.  Jarvis Roblero DO, [facility], [facility] Cardiologist 5/17/2024

## 2024-05-17 NOTE — HISTORY OF PRESENT ILLNESS
[FreeTextEntry1] : 33 yo male with no PMH. + Family hisptry Father had a MI at age of 40. He is here to review his EST and event recording. EST did not show any ischemia Motta score 8.0 He has had one episode of presyncope which passed quickly Past visit For the past 2 weeks he has noted episodes of dizziness, worsening with positional changes. He had an episode when he became profoundly dizzy HR 140s pale and diaphoretic. No chest pain Pt has lost 15 lbs recently and decreased ETOH consumption BP lying 122/80 HR 82 BP siting 124/76 HR 84.

## 2024-05-17 NOTE — DISCUSSION/SUMMARY
[FreeTextEntry1] : EMELINA GALAVIZ is a 34 year old M who presents today May 17, 2024 with the above history and the following active issues: Possible vasovagal  Reviewed the pathophysiology of vasovagal syncope. Instructed to avoid triggers such as prolonged standing, heat exposure, alcohol or dehydration and to assume a supine position with legs raised at the onset of symptoms to avoid a traumatic fall. Educated on counterpressure maneuvers such as tensing the arms with clenched fists to reduce lower extremity venous pooling and potentially abort a syncopal episode. Discussed red flag symptoms, which would warrant sooner or emergent medical evaluation. Any questions and concerns were addressed and resolved.

## 2024-05-31 ENCOUNTER — APPOINTMENT (OUTPATIENT)
Dept: FAMILY MEDICINE | Facility: CLINIC | Age: 34
End: 2024-05-31
Payer: COMMERCIAL

## 2024-05-31 VITALS
WEIGHT: 233 LBS | HEART RATE: 80 BPM | OXYGEN SATURATION: 98 % | DIASTOLIC BLOOD PRESSURE: 80 MMHG | HEIGHT: 68 IN | SYSTOLIC BLOOD PRESSURE: 130 MMHG | BODY MASS INDEX: 35.31 KG/M2

## 2024-05-31 DIAGNOSIS — F41.0 PANIC DISORDER [EPISODIC PAROXYSMAL ANXIETY]: ICD-10-CM

## 2024-05-31 PROCEDURE — 99214 OFFICE O/P EST MOD 30 MIN: CPT

## 2024-05-31 RX ORDER — SERTRALINE 25 MG/1
25 TABLET, FILM COATED ORAL
Qty: 30 | Refills: 1 | Status: ACTIVE | COMMUNITY
Start: 2024-05-31 | End: 1900-01-01

## 2024-06-03 PROBLEM — F41.0 PANIC ATTACKS: Status: ACTIVE | Noted: 2024-06-03
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
## 2024-06-03 NOTE — PLAN
[FreeTextEntry1] : -Discussed anxiety medication, informed him that the side effects are not life threatening or serious in nature. Advised to stay on the medication as prescribed for at least one week. Will start him on Zoloft 25mg.  -Recommended he follow-up with a therapist and a psychiatric nurse practitioner for additional / adjustment of medication. Will refer him to the behavioral health specialist affiliated with [facility].  -Recommended he go to [facility] if he feels an ongoing panic attack that cannot be curbed.  -Will follow-up with the patient in 2-3 weeks to reassess mood.

## 2024-06-20 ENCOUNTER — APPOINTMENT (OUTPATIENT)
Dept: FAMILY MEDICINE | Facility: CLINIC | Age: 34
End: 2024-06-20
Payer: COMMERCIAL

## 2024-06-20 DIAGNOSIS — F41.9 ANXIETY DISORDER, UNSPECIFIED: ICD-10-CM

## 2024-06-20 PROCEDURE — 99213 OFFICE O/P EST LOW 20 MIN: CPT

## 2024-06-20 PROCEDURE — G2211 COMPLEX E/M VISIT ADD ON: CPT

## 2024-06-20 NOTE — PLAN
[FreeTextEntry1] : -Recommended he start the medication on Monday. -Advised to follow-up via [facility] in 2 weeks to determine if he is taking the medication.  -Will follow-up in 1 month to discuss changes on the medication.

## 2024-06-20 NOTE — HISTORY OF PRESENT ILLNESS
[de-identified] : A 34-year-old male presents today to reassess mood. He has not been taking the Sertraline medication, states he has been afraid of taking it. Pt has not reached out to a therapist as of yet, has not heard from the  affiliated with [facility] either. He had c/o lightheadedness during his last golf outing, had also occurred when sitting on his couch. Confirms proper hydration. Denies headaches. Does not feel like a panic attack. He has gone for a full cardiac workup, no abnormalities noted.

## 2024-07-01 ENCOUNTER — TRANSCRIPTION ENCOUNTER (OUTPATIENT)
Age: 34
End: 2024-07-01

## 2024-07-24 ENCOUNTER — TRANSCRIPTION ENCOUNTER (OUTPATIENT)
Age: 34
End: 2024-07-24

## 2024-08-02 ENCOUNTER — APPOINTMENT (OUTPATIENT)
Dept: FAMILY MEDICINE | Facility: CLINIC | Age: 34
End: 2024-08-02

## 2024-08-02 VITALS
SYSTOLIC BLOOD PRESSURE: 120 MMHG | HEIGHT: 68 IN | DIASTOLIC BLOOD PRESSURE: 70 MMHG | TEMPERATURE: 98.1 F | WEIGHT: 227 LBS | HEART RATE: 70 BPM | OXYGEN SATURATION: 98 % | BODY MASS INDEX: 34.4 KG/M2

## 2024-08-02 DIAGNOSIS — R42 DIZZINESS AND GIDDINESS: ICD-10-CM

## 2024-08-02 DIAGNOSIS — B37.89 OTHER SITES OF CANDIDIASIS: ICD-10-CM

## 2024-08-02 DIAGNOSIS — R51.9 HEADACHE, UNSPECIFIED: ICD-10-CM

## 2024-08-02 PROCEDURE — 99214 OFFICE O/P EST MOD 30 MIN: CPT

## 2024-08-02 RX ORDER — ALPRAZOLAM 0.25 MG/1
0.25 TABLET ORAL
Qty: 30 | Refills: 0 | Status: ACTIVE | COMMUNITY
Start: 2024-08-02 | End: 1900-01-01

## 2024-08-02 RX ORDER — NYSTATIN 100000 [USP'U]/G
100000 CREAM TOPICAL TWICE DAILY
Qty: 1 | Refills: 0 | Status: ACTIVE | COMMUNITY
Start: 2024-08-02 | End: 1900-01-01

## 2024-08-02 RX ORDER — NYSTATIN 100000 1/G
100000 POWDER TOPICAL
Qty: 1 | Refills: 1 | Status: ACTIVE | COMMUNITY
Start: 2024-08-02 | End: 1900-01-01

## 2024-08-06 NOTE — HISTORY OF PRESENT ILLNESS
[FreeTextEntry8] : 33yo M presents with nausea, increased frequency in headaches and feeling off balance. Pt reports he felt off balance at work the other day and had to sit down. Pt reports generally feeling unwell. Pt reports headaches are more intense. Pt's wife is due in December with their first baby. Pt is anxious about starting sertraline he was prescribed at last visit. Pt states he knows he needs to start it but is concerned about doing so.

## 2024-08-06 NOTE — HISTORY OF PRESENT ILLNESS
[FreeTextEntry8] : 35yo M presents with nausea, increased frequency in headaches and feeling off balance. Pt reports he felt off balance at work the other day and had to sit down. Pt reports generally feeling unwell. Pt reports headaches are more intense. Pt's wife is due in December with their first baby. Pt is anxious about starting sertraline he was prescribed at last visit. Pt states he knows he needs to start it but is concerned about doing so.

## 2024-08-06 NOTE — PHYSICAL EXAM
[Normal] : normal rate, regular rhythm, normal S1 and S2 and no murmur heard [No Rash] : no rash [Normal Gait] : normal gait [Normal Affect] : the affect was normal

## 2024-08-06 NOTE — PLAN
[FreeTextEntry1] : Start xanax, encourage to take 1 hour prior to taking sertraline for first week  BW drawn today MR head ordered follow up in 4-6 weeks or sooner if needed

## 2024-08-10 LAB
ALBUMIN SERPL ELPH-MCNC: 4.6 G/DL
ALP BLD-CCNC: 81 U/L
ALT SERPL-CCNC: 34 U/L
ANION GAP SERPL CALC-SCNC: 11 MMOL/L
AST SERPL-CCNC: 25 U/L
BASOPHILS # BLD AUTO: 0.03 K/UL
BASOPHILS NFR BLD AUTO: 0.5 %
BILIRUB SERPL-MCNC: 0.4 MG/DL
BUN SERPL-MCNC: 15 MG/DL
CALCIUM SERPL-MCNC: 9.3 MG/DL
CHLORIDE SERPL-SCNC: 104 MMOL/L
CHOLEST SERPL-MCNC: 213 MG/DL
CO2 SERPL-SCNC: 25 MMOL/L
CREAT SERPL-MCNC: 0.94 MG/DL
EGFR: 110 ML/MIN/1.73M2
EOSINOPHIL # BLD AUTO: 0.14 K/UL
EOSINOPHIL NFR BLD AUTO: 2.5 %
ESTIMATED AVERAGE GLUCOSE: 111 MG/DL
GLUCOSE SERPL-MCNC: 106 MG/DL
HBA1C MFR BLD HPLC: 5.5 %
HCT VFR BLD CALC: 48 %
HDLC SERPL-MCNC: 52 MG/DL
HGB BLD-MCNC: 15.2 G/DL
IMM GRANULOCYTES NFR BLD AUTO: 0.4 %
LDLC SERPL CALC-MCNC: 149 MG/DL
LYMPHOCYTES # BLD AUTO: 1.86 K/UL
LYMPHOCYTES NFR BLD AUTO: 33.2 %
MAN DIFF?: NORMAL
MCHC RBC-ENTMCNC: 28 PG
MCHC RBC-ENTMCNC: 31.7 GM/DL
MCV RBC AUTO: 88.4 FL
MONOCYTES # BLD AUTO: 0.5 K/UL
MONOCYTES NFR BLD AUTO: 8.9 %
NEUTROPHILS # BLD AUTO: 3.05 K/UL
NEUTROPHILS NFR BLD AUTO: 54.5 %
NONHDLC SERPL-MCNC: 161 MG/DL
PLATELET # BLD AUTO: 259 K/UL
POTASSIUM SERPL-SCNC: 4.4 MMOL/L
PROT SERPL-MCNC: 7.3 G/DL
RBC # BLD: 5.43 M/UL
RBC # FLD: 14.3 %
SODIUM SERPL-SCNC: 140 MMOL/L
TRIGL SERPL-MCNC: 69 MG/DL
WBC # FLD AUTO: 5.6 K/UL

## 2024-08-12 ENCOUNTER — APPOINTMENT (OUTPATIENT)
Age: 34
End: 2024-08-12

## 2024-08-15 ENCOUNTER — TRANSCRIPTION ENCOUNTER (OUTPATIENT)
Age: 34
End: 2024-08-15

## 2024-08-19 ENCOUNTER — OUTPATIENT (OUTPATIENT)
Dept: OUTPATIENT SERVICES | Facility: HOSPITAL | Age: 34
LOS: 1 days | End: 2024-08-19
Payer: COMMERCIAL

## 2024-08-19 ENCOUNTER — APPOINTMENT (OUTPATIENT)
Dept: MRI IMAGING | Facility: CLINIC | Age: 34
End: 2024-08-19

## 2024-08-19 ENCOUNTER — TRANSCRIPTION ENCOUNTER (OUTPATIENT)
Age: 34
End: 2024-08-19

## 2024-08-19 DIAGNOSIS — R51.9 HEADACHE, UNSPECIFIED: ICD-10-CM

## 2024-08-19 DIAGNOSIS — R42 DIZZINESS AND GIDDINESS: ICD-10-CM

## 2024-08-19 PROCEDURE — 70551 MRI BRAIN STEM W/O DYE: CPT | Mod: 26

## 2024-08-20 ENCOUNTER — APPOINTMENT (OUTPATIENT)
Age: 34
End: 2024-08-20

## 2024-08-21 ENCOUNTER — TRANSCRIPTION ENCOUNTER (OUTPATIENT)
Age: 34
End: 2024-08-21

## 2024-09-09 ENCOUNTER — APPOINTMENT (OUTPATIENT)
Dept: NEUROLOGY | Facility: CLINIC | Age: 34
End: 2024-09-09

## 2024-09-11 ENCOUNTER — OFFICE (OUTPATIENT)
Dept: URBAN - METROPOLITAN AREA CLINIC 88 | Facility: CLINIC | Age: 34
Setting detail: OPHTHALMOLOGY
End: 2024-09-11
Payer: COMMERCIAL

## 2024-09-11 ENCOUNTER — APPOINTMENT (OUTPATIENT)
Dept: NEUROLOGY | Facility: CLINIC | Age: 34
End: 2024-09-11
Payer: COMMERCIAL

## 2024-09-11 ENCOUNTER — OFFICE (OUTPATIENT)
Dept: URBAN - METROPOLITAN AREA CLINIC 12 | Facility: CLINIC | Age: 34
Setting detail: OPHTHALMOLOGY
End: 2024-09-11
Payer: COMMERCIAL

## 2024-09-11 VITALS
SYSTOLIC BLOOD PRESSURE: 148 MMHG | BODY MASS INDEX: 36.1 KG/M2 | HEIGHT: 67 IN | HEART RATE: 74 BPM | DIASTOLIC BLOOD PRESSURE: 88 MMHG | WEIGHT: 230 LBS | OXYGEN SATURATION: 98 %

## 2024-09-11 DIAGNOSIS — G43.909 MIGRAINE, UNSPECIFIED, NOT INTRACTABLE, W/OUT STATUS MIGRAINOSUS: ICD-10-CM

## 2024-09-11 DIAGNOSIS — H33.312: ICD-10-CM

## 2024-09-11 DIAGNOSIS — H43.392: ICD-10-CM

## 2024-09-11 DIAGNOSIS — G44.209 TENSION-TYPE HEADACHE, UNSPECIFIED, NOT INTRACTABLE: ICD-10-CM

## 2024-09-11 PROCEDURE — 99205 OFFICE O/P NEW HI 60 MIN: CPT

## 2024-09-11 PROCEDURE — 92250 FUNDUS PHOTOGRAPHY W/I&R: CPT | Performed by: OPHTHALMOLOGY

## 2024-09-11 PROCEDURE — G2211 COMPLEX E/M VISIT ADD ON: CPT

## 2024-09-11 PROCEDURE — 92014 COMPRE OPH EXAM EST PT 1/>: CPT | Performed by: OPHTHALMOLOGY

## 2024-09-11 PROCEDURE — 401 NO CHARGE VISIT: Performed by: STUDENT IN AN ORGANIZED HEALTH CARE EDUCATION/TRAINING PROGRAM

## 2024-09-11 ASSESSMENT — CONFRONTATIONAL VISUAL FIELD TEST (CVF)
OD_FINDINGS: FULL
OD_FINDINGS: FULL
OS_FINDINGS: FULL
OS_FINDINGS: FULL

## 2024-09-12 NOTE — HISTORY OF PRESENT ILLNESS
[FreeTextEntry1] : Mr. Young is a 34-year-old male with history of anxiety, kidney stones who presents today for initial evaluation of headaches. He reports history of headaches, unsure of exact duration but assumes about a year or so, that are one sided, switching between left and right, described as throbbing behind the eye, more of an annoyance rather than painful (5/10). Notes associated light sensitivity with these headaches; However, they are infrequent, once every few months, and don't have much impact on him. He takes Tylenol or ibuprofen with decrease in intensity. In the past month since starting sertraline, he has noticed a new quality headache, described as either a "vice" around his head or a dull ache to the back of his head which is concerning him. These can last a few hours, coming and going, reporting about 12 headache days in the last month since onset. He had MRI brain wo contrast completed on 8/19/24 which was unremarkable. Denies recent change in sleep. He does admit to being on the computer all day at work. He also reports visual change at work one month ago where he saw a colored kaleidoscope in his vision, lasting about an hour before going away. Denies headache during that episode or following it. He denies that ever happening prior to this and with no recurrence since. He has no additional concerns.

## 2024-09-12 NOTE — DISCUSSION/SUMMARY
[FreeTextEntry1] : Mr. Young is a 34-year-old male with history of anxiety, kidney stones who presents today for initial evaluation of headaches and one episode of visual disturbance. No focal neurological deficits on exam. In regard to the patient's history of unilateral, throbbing headaches, this suggests a possible diagnosis of migraine headaches. Given that these headaches are less frequent and of less concern to him, he is not interested in initiating any abortive therapy at this time. He will continue on ibuprofen if needed and notify me if these headaches increase in frequency or severity and at that time will rediscuss trialing a medication. I discussed with the patient that his visual disturbance a month ago described as a kaleidoscope pattern could potentially be attributed to acephalgic migraines which is characterized by visual auras without a headache. He will monitor for recurrence and increase in frequency. The patient's new, more frequent headaches over the last month sound like tension type headaches. He may take OTC NSAIDs as needed for relief of these headaches as well, and I recommend he trial PT to see if this provides additional relief of these headaches. I encouraged the patient to keep a headache journal to track frequency of his headaches and any recurrence of visual symptoms. Plan to follow up in 3 months or sooner if needed.  All of the patient's questions and concerns were addressed.

## 2024-09-18 ENCOUNTER — OFFICE (OUTPATIENT)
Dept: URBAN - METROPOLITAN AREA CLINIC 103 | Facility: CLINIC | Age: 34
Setting detail: OPHTHALMOLOGY
End: 2024-09-18
Payer: COMMERCIAL

## 2024-09-18 DIAGNOSIS — H43.392: ICD-10-CM

## 2024-09-18 PROCEDURE — N/C NO CHARGE: Performed by: OPHTHALMOLOGY

## 2024-09-18 ASSESSMENT — CONFRONTATIONAL VISUAL FIELD TEST (CVF)
OS_FINDINGS: FULL
OD_FINDINGS: FULL

## 2024-09-19 NOTE — HISTORY OF PRESENT ILLNESS
[Home] : at home, [unfilled] , at the time of the visit. [Medical Office: ([facility])___] : at the medical office located in  [Spouse] : spouse [Verbal consent obtained from patient] : the patient, [unfilled] Heart valve

## 2024-09-24 ENCOUNTER — TRANSCRIPTION ENCOUNTER (OUTPATIENT)
Age: 34
End: 2024-09-24

## 2024-09-26 ENCOUNTER — TRANSCRIPTION ENCOUNTER (OUTPATIENT)
Age: 34
End: 2024-09-26

## 2024-10-30 ENCOUNTER — OFFICE (OUTPATIENT)
Dept: URBAN - METROPOLITAN AREA CLINIC 88 | Facility: CLINIC | Age: 34
Setting detail: OPHTHALMOLOGY
End: 2024-10-30
Payer: COMMERCIAL

## 2024-10-30 DIAGNOSIS — H43.391: ICD-10-CM

## 2024-10-30 PROCEDURE — 92134 CPTRZ OPH DX IMG PST SGM RTA: CPT | Performed by: OPHTHALMOLOGY

## 2024-10-30 PROCEDURE — 92012 INTRM OPH EXAM EST PATIENT: CPT | Performed by: OPHTHALMOLOGY

## 2024-10-30 ASSESSMENT — VISUAL ACUITY
OS_BCVA: 20/20
OD_BCVA: 20/30-

## 2024-10-30 ASSESSMENT — REFRACTION_AUTOREFRACTION
OD_AXIS: 179
OS_AXIS: 001
OD_CYLINDER: -0.75
OS_SPHERE: +0.25
OS_CYLINDER: -0.75

## 2024-10-30 ASSESSMENT — KERATOMETRY
OS_K1POWER_DIOPTERS: 44.00
OD_AXISANGLE_DEGREES: 088
OS_AXISANGLE_DEGREES: 087
OD_K1POWER_DIOPTERS: 43.75
OS_K2POWER_DIOPTERS: 44.75
OD_K2POWER_DIOPTERS: 44.75

## 2024-10-30 ASSESSMENT — PACHYMETRY
OS_CT_CORRECTION: 3
OD_CT_UM: 505
OD_CT_CORRECTION: 3
OS_CT_UM: 508

## 2024-10-30 ASSESSMENT — CONFRONTATIONAL VISUAL FIELD TEST (CVF)
OD_FINDINGS: FULL
OS_FINDINGS: FULL

## 2024-10-30 ASSESSMENT — TONOMETRY
OD_IOP_MMHG: 14
OS_IOP_MMHG: 15

## 2024-12-05 ENCOUNTER — APPOINTMENT (OUTPATIENT)
Age: 34
End: 2024-12-05

## 2024-12-09 ENCOUNTER — APPOINTMENT (OUTPATIENT)
Dept: NEUROLOGY | Facility: CLINIC | Age: 34
End: 2024-12-09

## 2024-12-11 ENCOUNTER — APPOINTMENT (OUTPATIENT)
Dept: NEUROLOGY | Facility: CLINIC | Age: 34
End: 2024-12-11

## 2025-01-31 ENCOUNTER — NON-APPOINTMENT (OUTPATIENT)
Age: 35
End: 2025-01-31

## 2025-03-13 ENCOUNTER — TRANSCRIPTION ENCOUNTER (OUTPATIENT)
Age: 35
End: 2025-03-13

## 2025-03-14 ENCOUNTER — APPOINTMENT (OUTPATIENT)
Dept: FAMILY MEDICINE | Facility: CLINIC | Age: 35
End: 2025-03-14
Payer: COMMERCIAL

## 2025-03-14 VITALS
HEART RATE: 80 BPM | HEIGHT: 67 IN | DIASTOLIC BLOOD PRESSURE: 74 MMHG | OXYGEN SATURATION: 98 % | BODY MASS INDEX: 38.77 KG/M2 | WEIGHT: 247 LBS | SYSTOLIC BLOOD PRESSURE: 114 MMHG

## 2025-03-14 DIAGNOSIS — F52.21 MALE ERECTILE DISORDER: ICD-10-CM

## 2025-03-14 DIAGNOSIS — F41.9 ANXIETY DISORDER, UNSPECIFIED: ICD-10-CM

## 2025-03-14 PROCEDURE — 99214 OFFICE O/P EST MOD 30 MIN: CPT

## 2025-03-14 PROCEDURE — G2211 COMPLEX E/M VISIT ADD ON: CPT

## 2025-03-14 RX ORDER — SILDENAFIL 25 MG/1
25 TABLET ORAL
Qty: 1 | Refills: 1 | Status: ACTIVE | COMMUNITY
Start: 2025-03-14 | End: 1900-01-01

## 2025-03-17 ENCOUNTER — TRANSCRIPTION ENCOUNTER (OUTPATIENT)
Age: 35
End: 2025-03-17

## 2025-04-03 ENCOUNTER — TRANSCRIPTION ENCOUNTER (OUTPATIENT)
Age: 35
End: 2025-04-03

## 2025-05-07 NOTE — ED PROVIDER NOTE - CROS ED SKIN ALL NEG
Medical condition is stable.  Continue same therapy.  Will recheck at next regular appointment    Orders:    levothyroxine (SYNTHROID, LEVOTHROID) 75 MCG tablet; Take 1 tablet by mouth Daily.     negative...

## 2025-07-22 ENCOUNTER — NON-APPOINTMENT (OUTPATIENT)
Age: 35
End: 2025-07-22

## 2025-07-24 ENCOUNTER — APPOINTMENT (OUTPATIENT)
Dept: FAMILY MEDICINE | Facility: CLINIC | Age: 35
End: 2025-07-24
Payer: COMMERCIAL

## 2025-07-24 VITALS
HEART RATE: 78 BPM | WEIGHT: 260 LBS | BODY MASS INDEX: 40.81 KG/M2 | OXYGEN SATURATION: 98 % | HEIGHT: 67 IN | DIASTOLIC BLOOD PRESSURE: 80 MMHG | SYSTOLIC BLOOD PRESSURE: 132 MMHG

## 2025-07-24 DIAGNOSIS — E66.9 OBESITY, UNSPECIFIED: ICD-10-CM

## 2025-07-24 PROCEDURE — G2211 COMPLEX E/M VISIT ADD ON: CPT

## 2025-07-24 PROCEDURE — 99214 OFFICE O/P EST MOD 30 MIN: CPT

## 2025-07-27 PROBLEM — E66.9 OBESITY: Status: ACTIVE | Noted: 2025-07-24

## 2025-07-27 LAB
ALBUMIN SERPL ELPH-MCNC: 4.7 G/DL
ALP BLD-CCNC: 84 U/L
ALT SERPL-CCNC: 71 U/L
ANION GAP SERPL CALC-SCNC: 14 MMOL/L
AST SERPL-CCNC: 43 U/L
BASOPHILS # BLD AUTO: 0.04 K/UL
BASOPHILS NFR BLD AUTO: 0.6 %
BILIRUB SERPL-MCNC: 0.4 MG/DL
BUN SERPL-MCNC: 14 MG/DL
CALCIUM SERPL-MCNC: 9.6 MG/DL
CHLORIDE SERPL-SCNC: 104 MMOL/L
CHOLEST SERPL-MCNC: 168 MG/DL
CO2 SERPL-SCNC: 21 MMOL/L
CREAT SERPL-MCNC: 0.9 MG/DL
EGFRCR SERPLBLD CKD-EPI 2021: 116 ML/MIN/1.73M2
EOSINOPHIL # BLD AUTO: 0.14 K/UL
EOSINOPHIL NFR BLD AUTO: 2.2 %
ESTIMATED AVERAGE GLUCOSE: 120 MG/DL
GLUCOSE SERPL-MCNC: 87 MG/DL
HBA1C MFR BLD HPLC: 5.8 %
HCT VFR BLD CALC: 44.8 %
HDLC SERPL-MCNC: 39 MG/DL
HGB BLD-MCNC: 14.6 G/DL
IMM GRANULOCYTES NFR BLD AUTO: 0.5 %
LDLC SERPL-MCNC: 113 MG/DL
LYMPHOCYTES # BLD AUTO: 1.78 K/UL
LYMPHOCYTES NFR BLD AUTO: 28.3 %
MAN DIFF?: NORMAL
MCHC RBC-ENTMCNC: 28.3 PG
MCHC RBC-ENTMCNC: 32.6 G/DL
MCV RBC AUTO: 86.8 FL
MONOCYTES # BLD AUTO: 0.62 K/UL
MONOCYTES NFR BLD AUTO: 9.8 %
NEUTROPHILS # BLD AUTO: 3.69 K/UL
NEUTROPHILS NFR BLD AUTO: 58.6 %
NONHDLC SERPL-MCNC: 129 MG/DL
PLATELET # BLD AUTO: 291 K/UL
POTASSIUM SERPL-SCNC: 4.6 MMOL/L
PROT SERPL-MCNC: 7.7 G/DL
RBC # BLD: 5.16 M/UL
RBC # FLD: 14.5 %
SODIUM SERPL-SCNC: 139 MMOL/L
TRIGL SERPL-MCNC: 84 MG/DL
TSH SERPL-ACNC: 0.89 UIU/ML
WBC # FLD AUTO: 6.3 K/UL

## 2025-08-06 ENCOUNTER — TRANSCRIPTION ENCOUNTER (OUTPATIENT)
Age: 35
End: 2025-08-06

## 2025-08-06 ENCOUNTER — NON-APPOINTMENT (OUTPATIENT)
Age: 35
End: 2025-08-06

## 2025-08-27 ENCOUNTER — TRANSCRIPTION ENCOUNTER (OUTPATIENT)
Age: 35
End: 2025-08-27

## 2025-09-04 ENCOUNTER — APPOINTMENT (OUTPATIENT)
Dept: CARDIOLOGY | Facility: CLINIC | Age: 35
End: 2025-09-04

## (undated) DEVICE — VENODYNE/SCD SLEEVE CALF LARGE

## (undated) DEVICE — GOWN TRIMAX LG

## (undated) DEVICE — PORT BIOPSY

## (undated) DEVICE — DRAPE C ARM UNIVERSAL

## (undated) DEVICE — TUBING TUR 2 PRONG

## (undated) DEVICE — WARMING BLANKET UPPER ADULT

## (undated) DEVICE — SOL IRR BAG NS 0.9% 3000ML

## (undated) DEVICE — Device